# Patient Record
Sex: MALE | Race: WHITE | URBAN - METROPOLITAN AREA
[De-identification: names, ages, dates, MRNs, and addresses within clinical notes are randomized per-mention and may not be internally consistent; named-entity substitution may affect disease eponyms.]

---

## 2017-07-12 ENCOUNTER — OFFICE VISIT (OUTPATIENT)
Dept: INTERNAL MEDICINE | Age: 67
End: 2017-07-12

## 2017-07-12 VITALS
DIASTOLIC BLOOD PRESSURE: 88 MMHG | WEIGHT: 179 LBS | HEIGHT: 69 IN | BODY MASS INDEX: 26.51 KG/M2 | SYSTOLIC BLOOD PRESSURE: 142 MMHG

## 2017-07-12 DIAGNOSIS — E03.8 HYPOTHYROIDISM DUE TO HASHIMOTO'S THYROIDITIS: ICD-10-CM

## 2017-07-12 DIAGNOSIS — E06.3 HYPOTHYROIDISM DUE TO HASHIMOTO'S THYROIDITIS: ICD-10-CM

## 2017-07-12 DIAGNOSIS — I10 ESSENTIAL HYPERTENSION, BENIGN: ICD-10-CM

## 2017-07-12 DIAGNOSIS — E78.00 PURE HYPERCHOLESTEROLEMIA: ICD-10-CM

## 2017-07-12 DIAGNOSIS — Z00.00 WELL ADULT EXAM: Primary | ICD-10-CM

## 2017-07-12 DIAGNOSIS — J30.1 ALLERGIC RHINITIS DUE TO POLLEN, UNSPECIFIED RHINITIS SEASONALITY: ICD-10-CM

## 2017-07-12 LAB — TSH SERPL DL<=0.05 MIU/L-ACNC: 1.7 UIU/ML (ref 0.27–4.2)

## 2017-07-12 PROCEDURE — 99397 PER PM REEVAL EST PAT 65+ YR: CPT | Performed by: INTERNAL MEDICINE

## 2017-07-12 PROCEDURE — 93000 ELECTROCARDIOGRAM COMPLETE: CPT | Performed by: INTERNAL MEDICINE

## 2017-07-12 ASSESSMENT — PATIENT HEALTH QUESTIONNAIRE - PHQ9
SUM OF ALL RESPONSES TO PHQ QUESTIONS 1-9: 0
2. FEELING DOWN, DEPRESSED OR HOPELESS: 0
1. LITTLE INTEREST OR PLEASURE IN DOING THINGS: 0
SUM OF ALL RESPONSES TO PHQ9 QUESTIONS 1 & 2: 0

## 2017-09-28 RX ORDER — LOSARTAN POTASSIUM AND HYDROCHLOROTHIAZIDE 25; 100 MG/1; MG/1
TABLET ORAL
Qty: 90 TABLET | Refills: 3 | Status: SHIPPED | OUTPATIENT
Start: 2017-09-28 | End: 2018-09-19 | Stop reason: SDUPTHER

## 2017-09-28 RX ORDER — PRAVASTATIN SODIUM 40 MG
TABLET ORAL
Qty: 90 TABLET | Refills: 3 | Status: SHIPPED | OUTPATIENT
Start: 2017-09-28 | End: 2018-09-19 | Stop reason: SDUPTHER

## 2017-10-26 ENCOUNTER — TELEPHONE (OUTPATIENT)
Dept: INTERNAL MEDICINE | Age: 67
End: 2017-10-26

## 2017-10-26 RX ORDER — LEVOTHYROXINE SODIUM 0.05 MG/1
TABLET ORAL
Qty: 90 TABLET | Refills: 3 | Status: SHIPPED | OUTPATIENT
Start: 2017-10-26 | End: 2018-10-10 | Stop reason: SDUPTHER

## 2018-03-01 ENCOUNTER — OFFICE VISIT (OUTPATIENT)
Dept: INTERNAL MEDICINE | Age: 68
End: 2018-03-01

## 2018-03-01 VITALS
OXYGEN SATURATION: 95 % | BODY MASS INDEX: 26.51 KG/M2 | WEIGHT: 179 LBS | HEART RATE: 90 BPM | HEIGHT: 69 IN | DIASTOLIC BLOOD PRESSURE: 90 MMHG | SYSTOLIC BLOOD PRESSURE: 158 MMHG | TEMPERATURE: 98 F

## 2018-03-01 DIAGNOSIS — J06.9 URI, ACUTE: Primary | ICD-10-CM

## 2018-03-01 PROCEDURE — 99213 OFFICE O/P EST LOW 20 MIN: CPT | Performed by: INTERNAL MEDICINE

## 2018-03-01 RX ORDER — AZITHROMYCIN 250 MG/1
250 TABLET, FILM COATED ORAL SEE ADMIN INSTRUCTIONS
Qty: 6 TABLET | Refills: 0 | Status: SHIPPED | OUTPATIENT
Start: 2018-03-01 | End: 2018-08-20 | Stop reason: CLARIF

## 2018-03-01 ASSESSMENT — ENCOUNTER SYMPTOMS
NAUSEA: 0
SORE THROAT: 0
CHEST TIGHTNESS: 0
RHINORRHEA: 1
WHEEZING: 0
SINUS PRESSURE: 0
EYE REDNESS: 0
SHORTNESS OF BREATH: 0
BACK PAIN: 0
COUGH: 1
ABDOMINAL PAIN: 0

## 2018-03-01 NOTE — PROGRESS NOTES
(ZITHROMAX) 250 MG tablet; Take 1 tablet by mouth See Admin Instructions Take 2 tablets at once on the first day , then one tablet daily till all are gone  Dispense: 6 tablet;  Refill: 0

## 2018-03-05 RX ORDER — PAROXETINE HYDROCHLORIDE 20 MG/1
TABLET, FILM COATED ORAL
Qty: 90 TABLET | Refills: 3 | Status: SHIPPED | OUTPATIENT
Start: 2018-03-05 | End: 2018-10-08 | Stop reason: SDUPTHER

## 2018-08-20 ENCOUNTER — OFFICE VISIT (OUTPATIENT)
Dept: INTERNAL MEDICINE | Age: 68
End: 2018-08-20

## 2018-08-20 VITALS
SYSTOLIC BLOOD PRESSURE: 136 MMHG | DIASTOLIC BLOOD PRESSURE: 90 MMHG | WEIGHT: 172 LBS | HEIGHT: 69 IN | BODY MASS INDEX: 25.48 KG/M2

## 2018-08-20 DIAGNOSIS — E78.00 PURE HYPERCHOLESTEROLEMIA: ICD-10-CM

## 2018-08-20 DIAGNOSIS — I10 ESSENTIAL HYPERTENSION, BENIGN: ICD-10-CM

## 2018-08-20 DIAGNOSIS — Z00.00 WELL ADULT EXAM: Primary | ICD-10-CM

## 2018-08-20 PROCEDURE — 93000 ELECTROCARDIOGRAM COMPLETE: CPT | Performed by: INTERNAL MEDICINE

## 2018-08-20 PROCEDURE — 99397 PER PM REEVAL EST PAT 65+ YR: CPT | Performed by: INTERNAL MEDICINE

## 2018-08-20 ASSESSMENT — PATIENT HEALTH QUESTIONNAIRE - PHQ9
2. FEELING DOWN, DEPRESSED OR HOPELESS: 0
SUM OF ALL RESPONSES TO PHQ QUESTIONS 1-9: 0
1. LITTLE INTEREST OR PLEASURE IN DOING THINGS: 0
SUM OF ALL RESPONSES TO PHQ QUESTIONS 1-9: 0
SUM OF ALL RESPONSES TO PHQ9 QUESTIONS 1 & 2: 0

## 2018-08-20 NOTE — PROGRESS NOTES
Annual Wellness Visit     Patient:  Michelle Reynoso                                               : 1950  Age: 76 y.o. MRN: [id number]  Date : 2018      CHIEF COMPLAINT: Michelle Reynoso is a 76 y.o. male who presents for : Physical exam    1. Well adult exam  Gen. he feels okay denies any chest pain shortness of breath or any other problems and exercising regularly does get occasional nosebleeds which stop with pressure on  the nares  - EKG 12 lead    2. Essential hypertension, benign  No complaints blood pressures been averaging about 120-80  to 130/90  - EKG 12 lead    3.  Pure hypercholesterolemia  No complaints no myalgias  - EKG 12 lead        Patient Active Problem List    Diagnosis Date Noted    Hypothyroid 2014    Depressive disorder, not elsewhere classified 2010    Essential hypertension, benign 2010    Allergic rhinitis 2010    Pure hypercholesterolemia 2010       Constitutional:  Denies fever or chills   Eyes:  Denies change in visual acuity   HENT:  Denies nasal congestion or sore throat   Respiratory:  Denies cough or shortness of breath   Cardiovascular:  Denies chest pain or edema   GI:  Denies abdominal pain, nausea, vomiting, bloody stools or diarrhea   :  Denies dysuria   Musculoskeletal:  Denies back pain or joint pain   Integument:  Denies rash   Neurologic:  Denies headache, focal weakness or sensory changes   Endocrine:  Denies polyuria or polydipsia   Lymphatic:  Denies swollen glands   Psychiatric:  Denies depression or anxiety     Past Medical History:        Diagnosis Date    Allergic rhinitis, cause unspecified 2010    Backache, unspecified 2010    Depressive disorder, not elsewhere classified 2010    Essential hypertension, benign 2010    Hypothyroid 2014    Myalgia 2012    Other and unspecified hyperlipidemia 2010    Subclinical hypothyroidism 2013       Past Surgical History:        Procedure

## 2018-09-19 RX ORDER — PRAVASTATIN SODIUM 40 MG
TABLET ORAL
Qty: 90 TABLET | Refills: 3 | Status: SHIPPED | OUTPATIENT
Start: 2018-09-19 | End: 2019-12-17 | Stop reason: SDUPTHER

## 2018-09-19 RX ORDER — LOSARTAN POTASSIUM AND HYDROCHLOROTHIAZIDE 25; 100 MG/1; MG/1
TABLET ORAL
Qty: 90 TABLET | Refills: 3 | Status: SHIPPED | OUTPATIENT
Start: 2018-09-19 | End: 2019-12-17 | Stop reason: SDUPTHER

## 2018-09-25 ENCOUNTER — OFFICE VISIT (OUTPATIENT)
Dept: ENT CLINIC | Age: 68
End: 2018-09-25
Payer: COMMERCIAL

## 2018-09-25 VITALS
SYSTOLIC BLOOD PRESSURE: 158 MMHG | DIASTOLIC BLOOD PRESSURE: 100 MMHG | WEIGHT: 172 LBS | HEART RATE: 98 BPM | OXYGEN SATURATION: 88 % | BODY MASS INDEX: 25.4 KG/M2

## 2018-09-25 DIAGNOSIS — R04.0 LEFT-SIDED EPISTAXIS: Primary | ICD-10-CM

## 2018-09-25 PROCEDURE — 30901 CONTROL OF NOSEBLEED: CPT | Performed by: OTOLARYNGOLOGY

## 2018-10-08 ENCOUNTER — TELEPHONE (OUTPATIENT)
Dept: INTERNAL MEDICINE CLINIC | Age: 68
End: 2018-10-08

## 2018-10-08 RX ORDER — PAROXETINE 30 MG/1
TABLET, FILM COATED ORAL
Qty: 30 TABLET | Refills: 2 | Status: SHIPPED | OUTPATIENT
Start: 2018-10-08 | End: 2018-11-05 | Stop reason: SDUPTHER

## 2018-10-10 RX ORDER — LEVOTHYROXINE SODIUM 50 MCG
TABLET ORAL
Qty: 90 TABLET | Refills: 3 | Status: SHIPPED | OUTPATIENT
Start: 2018-10-10 | End: 2019-09-24 | Stop reason: SDUPTHER

## 2018-11-05 ENCOUNTER — OFFICE VISIT (OUTPATIENT)
Dept: INTERNAL MEDICINE CLINIC | Age: 68
End: 2018-11-05
Payer: COMMERCIAL

## 2018-11-05 ENCOUNTER — TELEPHONE (OUTPATIENT)
Dept: INTERNAL MEDICINE CLINIC | Age: 68
End: 2018-11-05

## 2018-11-05 VITALS
HEIGHT: 69 IN | WEIGHT: 168 LBS | DIASTOLIC BLOOD PRESSURE: 100 MMHG | SYSTOLIC BLOOD PRESSURE: 160 MMHG | BODY MASS INDEX: 24.88 KG/M2

## 2018-11-05 DIAGNOSIS — R04.0 EPISTAXIS: ICD-10-CM

## 2018-11-05 DIAGNOSIS — F41.9 ANXIETY: Primary | ICD-10-CM

## 2018-11-05 PROCEDURE — 99213 OFFICE O/P EST LOW 20 MIN: CPT | Performed by: INTERNAL MEDICINE

## 2018-11-05 RX ORDER — PAROXETINE HYDROCHLORIDE 40 MG/1
TABLET, FILM COATED ORAL
Qty: 30 TABLET | Refills: 5 | Status: SHIPPED | OUTPATIENT
Start: 2018-11-05 | End: 2019-01-11 | Stop reason: SDUPTHER

## 2018-11-06 ENCOUNTER — TELEPHONE (OUTPATIENT)
Dept: INTERNAL MEDICINE CLINIC | Age: 68
End: 2018-11-06

## 2018-11-06 NOTE — TELEPHONE ENCOUNTER
Pt calling in stating he has been having trouble falling asleep at night.  It's been really bad the last 3 nights  Pt would like an Rx to help him fall asleep   pls send to [facility] [place]

## 2018-11-09 ENCOUNTER — OFFICE VISIT (OUTPATIENT)
Dept: PSYCHOLOGY | Age: 68
End: 2018-11-09
Payer: COMMERCIAL

## 2018-11-09 DIAGNOSIS — F41.1 GAD (GENERALIZED ANXIETY DISORDER): ICD-10-CM

## 2018-11-09 PROCEDURE — 90791 PSYCH DIAGNOSTIC EVALUATION: CPT | Performed by: PSYCHOLOGIST

## 2018-11-09 ASSESSMENT — ANXIETY QUESTIONNAIRES
1. FEELING NERVOUS, ANXIOUS, OR ON EDGE: 2-OVER HALF THE DAYS
2. NOT BEING ABLE TO STOP OR CONTROL WORRYING: 2-OVER HALF THE DAYS
3. WORRYING TOO MUCH ABOUT DIFFERENT THINGS: 2-OVER HALF THE DAYS
5. BEING SO RESTLESS THAT IT IS HARD TO SIT STILL: 0-NOT AT ALL SURE
GAD7 TOTAL SCORE: 9
7. FEELING AFRAID AS IF SOMETHING AWFUL MIGHT HAPPEN: 1-SEVERAL DAYS
6. BECOMING EASILY ANNOYED OR IRRITABLE: 0-NOT AT ALL SURE
4. TROUBLE RELAXING: 2-OVER HALF THE DAYS

## 2018-11-09 ASSESSMENT — PATIENT HEALTH QUESTIONNAIRE - PHQ9
4. FEELING TIRED OR HAVING LITTLE ENERGY: 1
7. TROUBLE CONCENTRATING ON THINGS, SUCH AS READING THE NEWSPAPER OR WATCHING TELEVISION: 0
2. FEELING DOWN, DEPRESSED OR HOPELESS: 2
10. IF YOU CHECKED OFF ANY PROBLEMS, HOW DIFFICULT HAVE THESE PROBLEMS MADE IT FOR YOU TO DO YOUR WORK, TAKE CARE OF THINGS AT HOME, OR GET ALONG WITH OTHER PEOPLE: 1
SUM OF ALL RESPONSES TO PHQ9 QUESTIONS 1 & 2: 3
8. MOVING OR SPEAKING SO SLOWLY THAT OTHER PEOPLE COULD HAVE NOTICED. OR THE OPPOSITE, BEING SO FIGETY OR RESTLESS THAT YOU HAVE BEEN MOVING AROUND A LOT MORE THAN USUAL: 0
SUM OF ALL RESPONSES TO PHQ QUESTIONS 1-9: 9
3. TROUBLE FALLING OR STAYING ASLEEP: 2
9. THOUGHTS THAT YOU WOULD BE BETTER OFF DEAD, OR OF HURTING YOURSELF: 0
5. POOR APPETITE OR OVEREATING: 2
1. LITTLE INTEREST OR PLEASURE IN DOING THINGS: 1
6. FEELING BAD ABOUT YOURSELF - OR THAT YOU ARE A FAILURE OR HAVE LET YOURSELF OR YOUR FAMILY DOWN: 1
SUM OF ALL RESPONSES TO PHQ QUESTIONS 1-9: 9

## 2018-11-09 NOTE — PROGRESS NOTES
nose surgery he had at age 25 that resulted in him hemorrhaging. He also has also significant life stressors at this time, including his daughter having breast cancer. Mr. Joey Liang currently engages in regular physical activity which is a positive, protective coping strategy. He has limited other coping strategies and is interested in learning additional strategies. PHQ Scores 11/9/2018 8/20/2018 7/12/2017 8/18/2014   PHQ2 Score 3 0 0 0   PHQ9 Score 9 0 0 0     Interpretation of Total Score Depression Severity: 1-4 = Minimal depression, 5-9 = Mild depression, 10-14 = Moderate depression, 15-19 = Moderately severe depression, 20-27 = Severe depression        JABIER 7 SCORE 11/9/2018   JABIER-7 Total Score 9     Interpretation of JABIER-7 score: 5-9 = mild anxiety, 10-14 = moderate anxiety, 15+ = severe anxiety. Recommend referral to behavioral health for scores 10 or greater.           Diagnosis:    JABIER    Plan:  Pt interventions:  Established rapport, Conducted functional assessment, [place]-setting to identify pt's primary goals for [facility] visit / overall health, Supportive techniques, Emphasized self-care as important for managing overall health and Provided Psychoeducation re: anxiety, stress response, role of medication in treatment, role of breathing on emotions, ABCs of emotions, false alarm of chronic anxiety,  taught and practiced diaphragmatic breathing, taught about the role of sleep, exercise, diet, physical illness, and drug/etoh use on mood and discussed skill mastery vs skill generalization, treatment planning         Pt Behavioral Change Plan:  Pt set goals to 1) practice diaphragmatic breathing 2x/day for 10 min when not anxious to work on skill mastery before skill generalization (download the free josie, [id number], to practice if needed)  2) return for f/u in one week

## 2018-11-12 PROBLEM — F41.1 GAD (GENERALIZED ANXIETY DISORDER): Status: ACTIVE | Noted: 2018-11-12

## 2018-11-16 ENCOUNTER — OFFICE VISIT (OUTPATIENT)
Dept: PSYCHOLOGY | Age: 68
End: 2018-11-16
Payer: COMMERCIAL

## 2018-11-16 DIAGNOSIS — F41.1 GAD (GENERALIZED ANXIETY DISORDER): Primary | ICD-10-CM

## 2018-11-16 PROCEDURE — 90832 PSYTX W PT 30 MINUTES: CPT | Performed by: PSYCHOLOGIST

## 2018-11-16 ASSESSMENT — PATIENT HEALTH QUESTIONNAIRE - PHQ9
2. FEELING DOWN, DEPRESSED OR HOPELESS: 1
10. IF YOU CHECKED OFF ANY PROBLEMS, HOW DIFFICULT HAVE THESE PROBLEMS MADE IT FOR YOU TO DO YOUR WORK, TAKE CARE OF THINGS AT HOME, OR GET ALONG WITH OTHER PEOPLE: 1
SUM OF ALL RESPONSES TO PHQ QUESTIONS 1-9: 5
5. POOR APPETITE OR OVEREATING: 1
6. FEELING BAD ABOUT YOURSELF - OR THAT YOU ARE A FAILURE OR HAVE LET YOURSELF OR YOUR FAMILY DOWN: 1
SUM OF ALL RESPONSES TO PHQ QUESTIONS 1-9: 5
7. TROUBLE CONCENTRATING ON THINGS, SUCH AS READING THE NEWSPAPER OR WATCHING TELEVISION: 0
8. MOVING OR SPEAKING SO SLOWLY THAT OTHER PEOPLE COULD HAVE NOTICED. OR THE OPPOSITE, BEING SO FIGETY OR RESTLESS THAT YOU HAVE BEEN MOVING AROUND A LOT MORE THAN USUAL: 0
3. TROUBLE FALLING OR STAYING ASLEEP: 1
9. THOUGHTS THAT YOU WOULD BE BETTER OFF DEAD, OR OF HURTING YOURSELF: 0
4. FEELING TIRED OR HAVING LITTLE ENERGY: 0
SUM OF ALL RESPONSES TO PHQ9 QUESTIONS 1 & 2: 2
1. LITTLE INTEREST OR PLEASURE IN DOING THINGS: 1

## 2018-11-16 ASSESSMENT — ANXIETY QUESTIONNAIRES
7. FEELING AFRAID AS IF SOMETHING AWFUL MIGHT HAPPEN: 0-NOT AT ALL SURE
5. BEING SO RESTLESS THAT IT IS HARD TO SIT STILL: 0-NOT AT ALL SURE
3. WORRYING TOO MUCH ABOUT DIFFERENT THINGS: 1-SEVERAL DAYS
GAD7 TOTAL SCORE: 4
1. FEELING NERVOUS, ANXIOUS, OR ON EDGE: 1-SEVERAL DAYS
4. TROUBLE RELAXING: 1-SEVERAL DAYS
6. BECOMING EASILY ANNOYED OR IRRITABLE: 0-NOT AT ALL SURE
2. NOT BEING ABLE TO STOP OR CONTROL WORRYING: 1-SEVERAL DAYS

## 2018-11-16 NOTE — PROGRESS NOTES
EVENING 90 tablet 3    losartan-hydrochlorothiazide (HYZAAR) 100-25 MG per tablet TAKE 1 TABLET DAILY 90 tablet 3    Cholecalciferol (VITAMIN D) 1000 UNIT CAPS Take  by mouth.  Multiple Vitamin (MULTIVITAMINS PO) Take  by mouth.  aspirin 81 MG tablet Take 81 mg by mouth daily. No current facility-administered medications for this visit. Social History:   Social History     Social History    Marital status:      Spouse name: N/A    Number of children: N/A    Years of education: N/A     Occupational History    Not on file. Social History Main Topics    Smoking status: Never Smoker    Smokeless tobacco: Never Used    Alcohol use Yes      Comment: occasionally    Drug use: Unknown    Sexual activity: Not on file     Other Topics Concern    Not on file     Social History Narrative    No narrative on file       TOBACCO:   reports that he has never smoked. He has never used smokeless tobacco.  ETOH:   reports that he drinks alcohol. Family History:   Family History   Problem Relation Age of Onset    Diabetes Father     Hypertension Father          A:  Mr. Tavia Alexander anxiety has improved since the last visit. He reports regularly practicing diaphragmatic breathing which he finds relaxing and helpful. Mr. Berna Reddy states he has felt \"like [his] normal self\" again, not getting overwhelmed with anxious thoughts about his nose bleeds and other stressors. He continue to learn strategies to manage his anxiety as he does continue to experience generalized anxiety. He was active and engaged and responded positively to behavioral interventions.     PHQ Scores 11/16/2018 11/9/2018 8/20/2018 7/12/2017 8/18/2014   PHQ2 Score 2 3 0 0 0   PHQ9 Score 5 9 0 0 0     Interpretation of Total Score Depression Severity: 1-4 = Minimal depression, 5-9 = Mild depression, 10-14 = Moderate depression, 15-19 = Moderately severe depression, 20-27 = Severe depression    JABIER 7 SCORE 11/16/2018 11/9/2018

## 2018-12-03 ENCOUNTER — OFFICE VISIT (OUTPATIENT)
Dept: PSYCHOLOGY | Age: 68
End: 2018-12-03
Payer: COMMERCIAL

## 2018-12-03 DIAGNOSIS — F41.1 GAD (GENERALIZED ANXIETY DISORDER): Primary | ICD-10-CM

## 2018-12-03 PROCEDURE — 90832 PSYTX W PT 30 MINUTES: CPT | Performed by: PSYCHOLOGIST

## 2018-12-03 ASSESSMENT — ANXIETY QUESTIONNAIRES
GAD7 TOTAL SCORE: 0
5. BEING SO RESTLESS THAT IT IS HARD TO SIT STILL: 0-NOT AT ALL SURE
4. TROUBLE RELAXING: 0-NOT AT ALL SURE
1. FEELING NERVOUS, ANXIOUS, OR ON EDGE: 0-NOT AT ALL SURE
2. NOT BEING ABLE TO STOP OR CONTROL WORRYING: 0-NOT AT ALL SURE
7. FEELING AFRAID AS IF SOMETHING AWFUL MIGHT HAPPEN: 0-NOT AT ALL SURE
3. WORRYING TOO MUCH ABOUT DIFFERENT THINGS: 0-NOT AT ALL SURE
6. BECOMING EASILY ANNOYED OR IRRITABLE: 0-NOT AT ALL SURE

## 2018-12-03 ASSESSMENT — PATIENT HEALTH QUESTIONNAIRE - PHQ9
2. FEELING DOWN, DEPRESSED OR HOPELESS: 0
1. LITTLE INTEREST OR PLEASURE IN DOING THINGS: 0
SUM OF ALL RESPONSES TO PHQ QUESTIONS 1-9: 0
SUM OF ALL RESPONSES TO PHQ QUESTIONS 1-9: 0
SUM OF ALL RESPONSES TO PHQ9 QUESTIONS 1 & 2: 0

## 2018-12-03 NOTE — PATIENT INSTRUCTIONS
\"The entire autonomic nervous system (and through it, our internal organs and glands) is largely driven by our breathing patterns. By changing our breathing we can influence millions of biochemical reactions in our body, producing more relaxing substances such as endorphins and fewer anxiety-producing ones like adrenaline and higher blood acidity. Mindfulness of the breath is so effective that it is common to all meditative and prayer traditions. \" Anxiety Fear & Breathing - Breathing. com    \"When overcoming high levels of anxiety, it is important to learn the techniques of correct breathing. Many people who live with high levels of anxiety are known to breathe through their chest. Shallow breathing through the chest means you are disrupting the balance of oxygen and carbon dioxide necessary to be in a relaxed state. This type of breathing will perpetuate the symptoms of anxiety. \" yoonew. [employer]        CONTROLLED or MEASURED BREATHING EXERCISE: (DOWNLOAD THE FREE DAMIEN \"VIRTUAL HOPE BOX\" TO PRACTICE THIS EXERCISE)        You can sit or stand, but be sure to soften up a little before you begin. Make sure your hands are relaxed, and your knees are soft. Drop your shoulders and let your jaw relax. Now breath in slowly through your nose and count to three, keep your shoulders down and allow your stomach to expand as you breathe in. Hold the breath for a moment. Now release your breath slowly and smoothly as you count to six.   Repeat for a couple of minutes

## 2018-12-06 ENCOUNTER — OFFICE VISIT (OUTPATIENT)
Dept: INTERNAL MEDICINE CLINIC | Age: 68
End: 2018-12-06
Payer: COMMERCIAL

## 2018-12-06 VITALS
WEIGHT: 174 LBS | HEIGHT: 69 IN | SYSTOLIC BLOOD PRESSURE: 140 MMHG | BODY MASS INDEX: 25.77 KG/M2 | DIASTOLIC BLOOD PRESSURE: 80 MMHG

## 2018-12-06 DIAGNOSIS — I10 ESSENTIAL HYPERTENSION, BENIGN: Primary | ICD-10-CM

## 2018-12-06 DIAGNOSIS — F41.1 GAD (GENERALIZED ANXIETY DISORDER): ICD-10-CM

## 2018-12-06 PROCEDURE — 99213 OFFICE O/P EST LOW 20 MIN: CPT | Performed by: INTERNAL MEDICINE

## 2019-01-11 DIAGNOSIS — R04.0 EPISTAXIS: ICD-10-CM

## 2019-01-11 DIAGNOSIS — F41.9 ANXIETY: ICD-10-CM

## 2019-01-11 RX ORDER — PAROXETINE HYDROCHLORIDE 40 MG/1
TABLET, FILM COATED ORAL
Qty: 90 TABLET | Refills: 0 | Status: SHIPPED | OUTPATIENT
Start: 2019-01-11 | End: 2019-04-22 | Stop reason: SDUPTHER

## 2019-03-21 RX ORDER — PAROXETINE HYDROCHLORIDE 20 MG/1
TABLET, FILM COATED ORAL
Qty: 90 TABLET | Refills: 3 | Status: SHIPPED | OUTPATIENT
Start: 2019-03-21 | End: 2019-04-22 | Stop reason: DRUGHIGH

## 2019-04-22 DIAGNOSIS — R04.0 EPISTAXIS: ICD-10-CM

## 2019-04-22 DIAGNOSIS — F41.9 ANXIETY: ICD-10-CM

## 2019-04-22 RX ORDER — PAROXETINE HYDROCHLORIDE 40 MG/1
TABLET, FILM COATED ORAL
Qty: 90 TABLET | Refills: 2 | Status: SHIPPED | OUTPATIENT
Start: 2019-04-22 | End: 2020-01-06

## 2019-09-18 ENCOUNTER — OFFICE VISIT (OUTPATIENT)
Dept: INTERNAL MEDICINE CLINIC | Age: 69
End: 2019-09-18
Payer: COMMERCIAL

## 2019-09-18 VITALS
DIASTOLIC BLOOD PRESSURE: 90 MMHG | SYSTOLIC BLOOD PRESSURE: 150 MMHG | WEIGHT: 174 LBS | HEIGHT: 69 IN | BODY MASS INDEX: 25.77 KG/M2

## 2019-09-18 DIAGNOSIS — E06.3 HYPOTHYROIDISM DUE TO HASHIMOTO'S THYROIDITIS: ICD-10-CM

## 2019-09-18 DIAGNOSIS — I10 ESSENTIAL HYPERTENSION, BENIGN: ICD-10-CM

## 2019-09-18 DIAGNOSIS — Z00.00 WELLNESS EXAMINATION: Primary | ICD-10-CM

## 2019-09-18 DIAGNOSIS — E78.00 PURE HYPERCHOLESTEROLEMIA: ICD-10-CM

## 2019-09-18 DIAGNOSIS — E03.8 HYPOTHYROIDISM DUE TO HASHIMOTO'S THYROIDITIS: ICD-10-CM

## 2019-09-18 PROBLEM — R04.0 LEFT-SIDED EPISTAXIS: Status: RESOLVED | Noted: 2018-09-25 | Resolved: 2019-09-18

## 2019-09-18 PROCEDURE — G0439 PPPS, SUBSEQ VISIT: HCPCS | Performed by: INTERNAL MEDICINE

## 2019-09-18 PROCEDURE — 93000 ELECTROCARDIOGRAM COMPLETE: CPT | Performed by: INTERNAL MEDICINE

## 2019-09-18 RX ORDER — FEXOFENADINE HCL 180 MG/1
180 TABLET ORAL DAILY
COMMUNITY

## 2019-09-18 RX ORDER — AZELASTINE HYDROCHLORIDE, FLUTICASONE PROPIONATE 137; 50 UG/1; UG/1
1 SPRAY, METERED NASAL DAILY
COMMUNITY
End: 2022-02-22 | Stop reason: CLARIF

## 2019-09-18 NOTE — PROGRESS NOTES
Annual Wellness Visit     Patient:  Carol Ann Quezada                                               : 1950  Age: 71 y.o. MRN: [id number]  Date : 2019      CHIEF COMPLAINT: Carol Ann Quezada is a 71 y.o. male who presents for : Physical exam    1. Wellness examination  He feels okay denies any chest pain shortness of breath or any other problems been exercising regularly  - EKG 12 Lead    2. Essential hypertension, benign  Complaints  - EKG 12 Lead    3. Pure hypercholesterolemia  Complaints no myalgias    4.  Hypothyroidism due to Hashimoto's thyroiditis  In terms of hypo-or hyperthyroidism        Patient Active Problem List    Diagnosis Date Noted    JABIER (generalized anxiety disorder) 2018    Hypothyroid 2014    Depressive disorder, not elsewhere classified 2010    Essential hypertension, benign 2010    Allergic rhinitis 2010    Pure hypercholesterolemia 2010       Constitutional:  Denies fever or chills   Eyes:  Denies change in visual acuity   HENT:  Denies nasal congestion or sore throat   Respiratory:  Denies cough or shortness of breath   Cardiovascular:  Denies chest pain or edema   GI:  Denies abdominal pain, nausea, vomiting, bloody stools or diarrhea   :  Denies dysuria   Musculoskeletal:  Denies back pain or joint pain   Integument:  Denies rash   Neurologic:  Denies headache, focal weakness or sensory changes   Endocrine:  Denies polyuria or polydipsia   Lymphatic:  Denies swollen glands   Psychiatric:  Denies depression or anxiety     Past Medical History:        Diagnosis Date    Allergic rhinitis, cause unspecified 2010    Backache, unspecified 2010    Depressive disorder, not elsewhere classified 2010    Essential hypertension, benign 2010    Hypothyroid 2014    Myalgia 2012    Other and unspecified hyperlipidemia 2010    Subclinical hypothyroidism 2013       Past Surgical History:        Procedure Laterality Date    UVULOPALATOPHARYGOPLASTY         Family History:  Family History   Problem Relation Age of Onset    Diabetes Father     Hypertension Father        Social History:  Social History     Socioeconomic History    Marital status:      Spouse name: None    Number of children: None    Years of education: None    Highest education level: None   Occupational History    None   Social Needs    Financial resource strain: None    Food insecurity:     Worry: None     Inability: None    Transportation needs:     Medical: None     Non-medical: None   Tobacco Use    Smoking status: Never Smoker    Smokeless tobacco: Never Used   Substance and Sexual Activity    Alcohol use: Yes     Comment: occasionally    Drug use: None    Sexual activity: None   Lifestyle    Physical activity:     Days per week: None     Minutes per session: None    Stress: None   Relationships    Social connections:     Talks on phone: None     Gets together: None     Attends Worship service: None     Active member of club or organization: None     Attends meetings of clubs or organizations: None     Relationship status: None    Intimate partner violence:     Fear of current or ex partner: None     Emotionally abused: None     Physically abused: None     Forced sexual activity: None   Other Topics Concern    None   Social History Narrative    None         Allergies:  Patient has no known allergies. Current Medications:    Prior to Admission medications    Medication Sig Start Date End Date Taking?  Authorizing Provider   fexofenadine (ALLEGRA ALLERGY) 180 MG tablet Take 180 mg by mouth daily   Yes Historical Provider, MD   Azelastine-Fluticasone (DYMISTA) 137-50 MCG/ACT SUSP 1 spray by Nasal route daily Left nostril   Yes Historical Provider, MD   PARoxetine (PAXIL) 40 MG tablet TAKE 1 TABLET EVERY MORNING 4/22/19  Yes Perla Ragsdale MD   SYNTHROID 50 MCG tablet TAKE 1 TABLET DAILY 10/10/18  Yes Perla Ragsdale MD (H) 09/22/2011       LFT's: No results found for: ALT, AST, GGT, ALKPHOS, BILITOT    Lipids: No results found for: CHOL, HDL, LDLCALC, TRIG    INR: No results found for: INR, PROTIME    U/A:No results found for: LABMICR, [id number]     No results found for: LABA1C     Lab Results   Component Value Date    CREATININE 1.2 09/22/2011       -----------------------------------------------------------------     Assessment/Plan:   1. Wellness examination  Screening labs he is up-to-date on all his immunizations continue diet and exercise he got a flu shot yesterday  - EKG 12 Lead    2. Essential hypertension, benign  Blood pressure slightly elevated question of whitecoat hypertension he is to check his blood pressure 3 times at home and call with results continue present meds for now  - EKG 12 Lead    3. Pure hypercholesterolemia  Problem stable continue present meds check above labs    4.  Hypothyroidism due to Hashimoto's thyroiditis  Clinically euthyroid check above labs continue present meds for now

## 2019-09-24 RX ORDER — LEVOTHYROXINE SODIUM 50 MCG
TABLET ORAL
Qty: 90 TABLET | Refills: 3 | Status: SHIPPED | OUTPATIENT
Start: 2019-09-24 | End: 2020-01-29 | Stop reason: SDUPTHER

## 2019-12-18 RX ORDER — LOSARTAN POTASSIUM AND HYDROCHLOROTHIAZIDE 25; 100 MG/1; MG/1
TABLET ORAL
Qty: 90 TABLET | Refills: 3 | Status: SHIPPED | OUTPATIENT
Start: 2019-12-18 | End: 2020-01-29 | Stop reason: SDUPTHER

## 2019-12-18 RX ORDER — PRAVASTATIN SODIUM 40 MG
TABLET ORAL
Qty: 90 TABLET | Refills: 3 | Status: SHIPPED | OUTPATIENT
Start: 2019-12-18 | End: 2020-01-29 | Stop reason: SDUPTHER

## 2020-01-06 RX ORDER — PAROXETINE HYDROCHLORIDE 40 MG/1
TABLET, FILM COATED ORAL
Qty: 90 TABLET | Refills: 2 | Status: SHIPPED | OUTPATIENT
Start: 2020-01-06 | End: 2020-01-29 | Stop reason: SDUPTHER

## 2020-01-29 ENCOUNTER — TELEPHONE (OUTPATIENT)
Dept: INTERNAL MEDICINE CLINIC | Age: 70
End: 2020-01-29

## 2020-01-29 RX ORDER — PRAVASTATIN SODIUM 40 MG
TABLET ORAL
Qty: 90 TABLET | Refills: 3 | Status: SHIPPED | OUTPATIENT
Start: 2020-01-29 | End: 2021-02-03

## 2020-01-29 RX ORDER — LEVOTHYROXINE SODIUM 0.05 MG/1
TABLET ORAL
Qty: 90 TABLET | Refills: 3 | Status: SHIPPED | OUTPATIENT
Start: 2020-01-29 | End: 2021-02-03

## 2020-01-29 RX ORDER — LOSARTAN POTASSIUM AND HYDROCHLOROTHIAZIDE 25; 100 MG/1; MG/1
TABLET ORAL
Qty: 90 TABLET | Refills: 3 | Status: SHIPPED
Start: 2020-01-29 | End: 2020-04-21 | Stop reason: RX

## 2020-01-29 RX ORDER — PAROXETINE HYDROCHLORIDE 40 MG/1
TABLET, FILM COATED ORAL
Qty: 90 TABLET | Refills: 2 | Status: SHIPPED | OUTPATIENT
Start: 2020-01-29 | End: 2021-01-18 | Stop reason: SDUPTHER

## 2020-04-21 ENCOUNTER — TELEPHONE (OUTPATIENT)
Dept: INTERNAL MEDICINE CLINIC | Age: 70
End: 2020-04-21

## 2020-04-21 RX ORDER — LOSARTAN POTASSIUM 100 MG/1
100 TABLET ORAL DAILY
Qty: 90 TABLET | Refills: 1 | Status: SHIPPED | OUTPATIENT
Start: 2020-04-21 | End: 2020-09-23 | Stop reason: SDUPTHER

## 2020-04-21 RX ORDER — HYDROCHLOROTHIAZIDE 25 MG/1
25 TABLET ORAL EVERY MORNING
Qty: 90 TABLET | Refills: 1 | Status: SHIPPED | OUTPATIENT
Start: 2020-04-21 | End: 2020-09-29 | Stop reason: SDUPTHER

## 2020-07-30 ENCOUNTER — OFFICE VISIT (OUTPATIENT)
Dept: PRIMARY CARE CLINIC | Age: 70
End: 2020-07-30
Payer: COMMERCIAL

## 2020-07-30 PROCEDURE — 99211 OFF/OP EST MAY X REQ PHY/QHP: CPT | Performed by: NURSE PRACTITIONER

## 2020-07-30 NOTE — PATIENT INSTRUCTIONS
Advance Care Planning  People with COVID-19 may have no symptoms, mild symptoms, such as fever, cough, and shortness of breath or they may have more severe illness, developing severe and fatal pneumonia. As a result, Advance Care Planning with attention to naming a health care decision maker (someone you trust to make healthcare decisions for you if you could not speak for yourself) and sharing other health care preferences is important BEFORE a possible health crisis. Please contact your Primary Care Provider to discuss Advance Care Planning. Preventing the Spread of Coronavirus Disease 2019 in Homes and Residential Communities  For the most recent information go to [email or web address]    Prevention steps for People with confirmed or suspected COVID-19 (including persons under investigation) who do not need to be hospitalized  and   People with confirmed COVID-19 who were hospitalized and determined to be medically stable to go home    Your healthcare provider and public health staff will evaluate whether you can be cared for at home. If it is determined that you do not need to be hospitalized and can be isolated at home, you will be monitored by staff from your local or state health department. You should follow the prevention steps below until a healthcare provider or local or state health department says you can return to your normal activities. Stay home except to get medical care  People who are mildly ill with COVID-19 are able to isolate at home during their illness. You should restrict activities outside your home, except for getting medical care. Do not go to work, school, or public areas. Avoid using public transportation, ride-sharing, or taxis. Separate yourself from other people and animals in your home  People: As much as possible, you should stay in a specific room and away from other people in your home.  Also, you should use a separate bathroom, if available. Animals: You should restrict contact with pets and other animals while you are sick with COVID-19, just like you would around other people. Although there have not been reports of pets or other animals becoming sick with COVID-19, it is still recommended that people sick with COVID-19 limit contact with animals until more information is known about the virus. When possible, have another member of your household care for your animals while you are sick. If you are sick with COVID-19, avoid contact with your pet, including petting, snuggling, being kissed or licked, and sharing food. If you must care for your pet or be around animals while you are sick, wash your hands before and after you interact with pets and wear a facemask. Call ahead before visiting your doctor  If you have a medical appointment, call the healthcare provider and tell them that you have or may have COVID-19. This will help the healthcare providers office take steps to keep other people from getting infected or exposed. Wear a facemask  You should wear a facemask when you are around other people (e.g., sharing a room or vehicle) or pets and before you enter a healthcare providers office. If you are not able to wear a facemask (for example, because it causes trouble breathing), then people who live with you should not stay in the same room with you, or they should wear a facemask if they enter your room. Cover your coughs and sneezes  Cover your mouth and nose with a tissue when you cough or sneeze. Throw used tissues in a lined trash can. Immediately wash your hands with soap and water for at least 20 seconds or, if soap and water are not available, clean your hands with an alcohol-based hand  that contains at least 60% alcohol.   Clean your hands often  Wash your hands often with soap and water for at least 20 seconds, especially after blowing your nose, coughing, or sneezing; going to the bathroom; and have a medical emergency and need to call 911, notify the dispatch personnel that you have, or are being evaluated for COVID-19. If possible, put on a facemask before emergency medical services arrive. Discontinuing home isolation  Patients with confirmed COVID-19 should remain under home isolation precautions until the risk of secondary transmission to others is thought to be low. The decision to discontinue home isolation precautions should be made on a case-by-case basis, in consultation with healthcare providers and state and local health departments.

## 2020-08-01 LAB — SARS-COV-2, NAA: NOT DETECTED

## 2020-09-23 RX ORDER — LOSARTAN POTASSIUM 100 MG/1
100 TABLET ORAL DAILY
Qty: 90 TABLET | Refills: 0 | Status: SHIPPED | OUTPATIENT
Start: 2020-09-23 | End: 2020-11-05 | Stop reason: CLARIF

## 2020-09-23 NOTE — TELEPHONE ENCOUNTER
Patient requesting medication refill for Losartan 100 mg and Hydrodiuril 25 mg 90 day supply for both medications sent to [facility]. Please Advise.

## 2020-09-29 ENCOUNTER — TELEPHONE (OUTPATIENT)
Dept: INTERNAL MEDICINE CLINIC | Age: 70
End: 2020-09-29

## 2020-09-29 RX ORDER — HYDROCHLOROTHIAZIDE 25 MG/1
25 TABLET ORAL EVERY MORNING
Qty: 90 TABLET | Refills: 0 | Status: SHIPPED
Start: 2020-09-29 | End: 2020-11-05 | Stop reason: CLARIF

## 2020-09-29 NOTE — TELEPHONE ENCOUNTER
Call returned to patient. Patient notified that he needs to schedule for a physical exam.    Medication sent to pharmacy.

## 2020-09-29 NOTE — TELEPHONE ENCOUNTER
Patient called to follow up on the refill request for hydroCHLOROthiazide (HYDRODIURIL) 25 MG tablet     [facility] - [address], [address] [facility] [address] [phone]     Please call and advise.

## 2020-10-01 ENCOUNTER — TELEPHONE (OUTPATIENT)
Dept: INTERNAL MEDICINE CLINIC | Age: 70
End: 2020-10-01

## 2020-10-20 DIAGNOSIS — E03.8 HYPOTHYROIDISM DUE TO HASHIMOTO'S THYROIDITIS: ICD-10-CM

## 2020-10-20 DIAGNOSIS — E78.00 PURE HYPERCHOLESTEROLEMIA: ICD-10-CM

## 2020-10-20 DIAGNOSIS — I10 ESSENTIAL HYPERTENSION, BENIGN: ICD-10-CM

## 2020-10-20 DIAGNOSIS — Z12.5 SCREENING PSA (PROSTATE SPECIFIC ANTIGEN): ICD-10-CM

## 2020-10-20 DIAGNOSIS — E06.3 HYPOTHYROIDISM DUE TO HASHIMOTO'S THYROIDITIS: ICD-10-CM

## 2020-10-20 LAB
A/G RATIO: 2 (ref 1.1–2.2)
ALBUMIN SERPL-MCNC: 4.7 G/DL (ref 3.4–5)
ALP BLD-CCNC: 86 U/L (ref 40–129)
ALT SERPL-CCNC: 24 U/L (ref 10–40)
ANION GAP SERPL CALCULATED.3IONS-SCNC: 10 MMOL/L (ref 3–16)
AST SERPL-CCNC: 26 U/L (ref 15–37)
BASOPHILS ABSOLUTE: 0 K/UL (ref 0–0.2)
BASOPHILS RELATIVE PERCENT: 0.9 %
BILIRUB SERPL-MCNC: 1.4 MG/DL (ref 0–1)
BILIRUBIN URINE: NEGATIVE
BLOOD, URINE: NEGATIVE
BUN BLDV-MCNC: 22 MG/DL (ref 7–20)
CALCIUM SERPL-MCNC: 9.9 MG/DL (ref 8.3–10.6)
CHLORIDE BLD-SCNC: 99 MMOL/L (ref 99–110)
CHOLESTEROL, TOTAL: 192 MG/DL (ref 0–199)
CLARITY: CLEAR
CO2: 30 MMOL/L (ref 21–32)
COLOR: YELLOW
CREAT SERPL-MCNC: 1 MG/DL (ref 0.8–1.3)
EOSINOPHILS ABSOLUTE: 0.1 K/UL (ref 0–0.6)
EOSINOPHILS RELATIVE PERCENT: 2.2 %
GFR AFRICAN AMERICAN: >60
GFR NON-AFRICAN AMERICAN: >60
GLOBULIN: 2.4 G/DL
GLUCOSE BLD-MCNC: 96 MG/DL (ref 70–99)
GLUCOSE URINE: NEGATIVE MG/DL
HCT VFR BLD CALC: 45.2 % (ref 40.5–52.5)
HDLC SERPL-MCNC: 68 MG/DL (ref 40–60)
HEMOGLOBIN: 15 G/DL (ref 13.5–17.5)
KETONES, URINE: NEGATIVE MG/DL
LDL CHOLESTEROL CALCULATED: 90 MG/DL
LEUKOCYTE ESTERASE, URINE: NEGATIVE
LYMPHOCYTES ABSOLUTE: 2.5 K/UL (ref 1–5.1)
LYMPHOCYTES RELATIVE PERCENT: 49.1 %
MCH RBC QN AUTO: 30.2 PG (ref 26–34)
MCHC RBC AUTO-ENTMCNC: 33.1 G/DL (ref 31–36)
MCV RBC AUTO: 91.2 FL (ref 80–100)
MICROSCOPIC EXAMINATION: NORMAL
MONOCYTES ABSOLUTE: 0.4 K/UL (ref 0–1.3)
MONOCYTES RELATIVE PERCENT: 7.7 %
NEUTROPHILS ABSOLUTE: 2 K/UL (ref 1.7–7.7)
NEUTROPHILS RELATIVE PERCENT: 40.1 %
NITRITE, URINE: NEGATIVE
PDW BLD-RTO: 13.4 % (ref 12.4–15.4)
PH UA: 6 (ref 5–8)
PLATELET # BLD: 232 K/UL (ref 135–450)
PMV BLD AUTO: 9.4 FL (ref 5–10.5)
POTASSIUM SERPL-SCNC: 4.1 MMOL/L (ref 3.5–5.1)
PROSTATE SPECIFIC ANTIGEN: 1.74 NG/ML (ref 0–4)
PROTEIN UA: NEGATIVE MG/DL
RBC # BLD: 4.95 M/UL (ref 4.2–5.9)
SODIUM BLD-SCNC: 139 MMOL/L (ref 136–145)
SPECIFIC GRAVITY UA: 1.02 (ref 1–1.03)
TOTAL PROTEIN: 7.1 G/DL (ref 6.4–8.2)
TRIGL SERPL-MCNC: 172 MG/DL (ref 0–150)
TSH SERPL DL<=0.05 MIU/L-ACNC: 1.82 UIU/ML (ref 0.27–4.2)
URINE TYPE: NORMAL
UROBILINOGEN, URINE: 0.2 E.U./DL
VLDLC SERPL CALC-MCNC: 34 MG/DL
WBC # BLD: 5.1 K/UL (ref 4–11)

## 2020-11-05 ENCOUNTER — OFFICE VISIT (OUTPATIENT)
Dept: INTERNAL MEDICINE CLINIC | Age: 70
End: 2020-11-05
Payer: MEDICARE

## 2020-11-05 VITALS
BODY MASS INDEX: 26.07 KG/M2 | SYSTOLIC BLOOD PRESSURE: 144 MMHG | TEMPERATURE: 97.8 F | HEIGHT: 69 IN | DIASTOLIC BLOOD PRESSURE: 97 MMHG | WEIGHT: 176 LBS

## 2020-11-05 PROCEDURE — 93000 ELECTROCARDIOGRAM COMPLETE: CPT | Performed by: INTERNAL MEDICINE

## 2020-11-05 PROCEDURE — G0402 INITIAL PREVENTIVE EXAM: HCPCS | Performed by: INTERNAL MEDICINE

## 2020-11-05 PROCEDURE — 3017F COLORECTAL CA SCREEN DOC REV: CPT | Performed by: INTERNAL MEDICINE

## 2020-11-05 PROCEDURE — 1123F ACP DISCUSS/DSCN MKR DOCD: CPT | Performed by: INTERNAL MEDICINE

## 2020-11-05 PROCEDURE — G8482 FLU IMMUNIZE ORDER/ADMIN: HCPCS | Performed by: INTERNAL MEDICINE

## 2020-11-05 PROCEDURE — 4040F PNEUMOC VAC/ADMIN/RCVD: CPT | Performed by: INTERNAL MEDICINE

## 2020-11-05 RX ORDER — LOSARTAN POTASSIUM AND HYDROCHLOROTHIAZIDE 25; 100 MG/1; MG/1
1 TABLET ORAL DAILY
Qty: 90 TABLET | Refills: 3 | Status: SHIPPED | OUTPATIENT
Start: 2020-11-05 | End: 2020-12-03 | Stop reason: SDUPTHER

## 2020-11-05 RX ORDER — LOSARTAN POTASSIUM AND HYDROCHLOROTHIAZIDE 25; 100 MG/1; MG/1
1 TABLET ORAL DAILY
COMMUNITY
End: 2020-11-05 | Stop reason: SDUPTHER

## 2020-11-05 RX ORDER — FLUTICASONE PROPIONATE 50 MCG
1 SPRAY, SUSPENSION (ML) NASAL DAILY
COMMUNITY

## 2020-11-05 ASSESSMENT — LIFESTYLE VARIABLES
HAVE YOU OR SOMEONE ELSE BEEN INJURED AS A RESULT OF YOUR DRINKING: 0
HOW MANY STANDARD DRINKS CONTAINING ALCOHOL DO YOU HAVE ON A TYPICAL DAY: 0
AUDIT TOTAL SCORE: 2
HOW OFTEN DURING THE LAST YEAR HAVE YOU FAILED TO DO WHAT WAS NORMALLY EXPECTED FROM YOU BECAUSE OF DRINKING: 0
HOW OFTEN DO YOU HAVE SIX OR MORE DRINKS ON ONE OCCASION: 0
HOW OFTEN DO YOU HAVE A DRINK CONTAINING ALCOHOL: 2
HOW OFTEN DURING THE LAST YEAR HAVE YOU HAD A FEELING OF GUILT OR REMORSE AFTER DRINKING: 0
AUDIT-C TOTAL SCORE: 2
HAS A RELATIVE, FRIEND, DOCTOR, OR ANOTHER HEALTH PROFESSIONAL EXPRESSED CONCERN ABOUT YOUR DRINKING OR SUGGESTED YOU CUT DOWN: 0
HOW OFTEN DURING THE LAST YEAR HAVE YOU BEEN UNABLE TO REMEMBER WHAT HAPPENED THE NIGHT BEFORE BECAUSE YOU HAD BEEN DRINKING: 0
HOW OFTEN DURING THE LAST YEAR HAVE YOU FOUND THAT YOU WERE NOT ABLE TO STOP DRINKING ONCE YOU HAD STARTED: 0
HOW OFTEN DURING THE LAST YEAR HAVE YOU NEEDED AN ALCOHOLIC DRINK FIRST THING IN THE MORNING TO GET YOURSELF GOING AFTER A NIGHT OF HEAVY DRINKING: 0

## 2020-11-05 ASSESSMENT — PATIENT HEALTH QUESTIONNAIRE - PHQ9
2. FEELING DOWN, DEPRESSED OR HOPELESS: 0
1. LITTLE INTEREST OR PLEASURE IN DOING THINGS: 0
SUM OF ALL RESPONSES TO PHQ QUESTIONS 1-9: 0
SUM OF ALL RESPONSES TO PHQ9 QUESTIONS 1 & 2: 0

## 2020-11-05 NOTE — PROGRESS NOTES
Medicare Annual Wellness Visit  Name: Balwinder Velarde Date: 2020   MRN: <[id number]> Sex: Male   Age: 79 y.o. Ethnicity: Non-/Non    : 1950 Race: Savannah Holder is here for Medicare AWV    Screenings for behavioral, psychosocial and functional/safety risks, and cognitive dysfunction are all negative except as indicated below. These results, as well as other patient data from the [address] [facility] form, are documented in Flowsheets linked to this Encounter. No Known Allergies    Prior to Visit Medications    Medication Sig Taking? Authorizing Provider   fluticasone (FLONASE) 50 MCG/ACT nasal spray 1 spray by Each Nostril route daily Yes Historical Provider, MD   losartan-hydroCHLOROthiazide (HYZAAR) 100-25 MG per tablet Take 1 tablet by mouth daily Yes Historical Provider, MD   PARoxetine (PAXIL) 40 MG tablet Take 1 tablet every morning Yes Aniyah Hopper MD   pravastatin (PRAVACHOL) 40 MG tablet TAKE 1 TABLET EVERY EVENING Yes Aniyah Hopper MD   levothyroxine (SYNTHROID) 50 MCG tablet TAKE 1 TABLET DAILY Yes Aniyah Hopper MD   fexofenadine (ALLEGRA ALLERGY) 180 MG tablet Take 180 mg by mouth daily Yes Historical Provider, MD   Azelastine-Fluticasone (DYMISTA) 137-50 MCG/ACT SUSP 1 spray by Nasal route daily Left nostril Yes Historical Provider, MD   Cholecalciferol (VITAMIN D) 1000 UNIT CAPS Take  by mouth. Yes Historical Provider, MD   Multiple Vitamin (MULTIVITAMINS PO) Take  by mouth.  Yes Historical Provider, MD       Past Medical History:   Diagnosis Date    Allergic rhinitis, cause unspecified 2010    Backache, unspecified 2010    Depressive disorder, not elsewhere classified 2010    Essential hypertension, benign 2010    Hypothyroid 2014    Myalgia 2012    Other and unspecified hyperlipidemia 2010    Subclinical hypothyroidism 2013       Past Surgical History:   Procedure Laterality Date    UVULOPALATOPHARYGOPLASTY         Family History   Problem Relation Age of Onset    Diabetes Father     Hypertension Father        CareTeam (Including outside providers/suppliers regularly involved in providing care):   Patient Care Team:  Ignacio Canada MD as PCP - General (Internal Medicine)  Ignacio Canada MD [facility]    Wt Readings from Last 3 Encounters:   11/05/20 176 lb (79.8 kg)   09/18/19 174 lb (78.9 kg)   12/06/18 174 lb (78.9 kg)     Vitals:    11/05/20 1405   BP: (!) 144/97   Temp: 97.8 °F (36.6 °C)   Weight: 176 lb (79.8 kg)   Height: 5' 9\" (1.753 m)     Body mass index is 25.99 kg/m². Based upon direct observation of the patient, evaluation of cognition reveals recent and remote memory intact.     General Appearance: alert and oriented to person, place and time, well developed and well- nourished, in no acute distress  Skin: warm and dry, no rash or erythema  Head: normocephalic and atraumatic  Eyes: pupils equal, round, and reactive to light, extraocular eye movements intact, conjunctivae normal  ENT: tympanic membrane, external ear and ear canal normal bilaterally, nose without deformity, nasal mucosa and turbinates normal without polyps  Neck: supple and non-tender without mass, no thyromegaly or thyroid nodules, no cervical lymphadenopathy  Pulmonary/Chest: clear to auscultation bilaterally- no wheezes, rales or rhonchi, normal air movement, no respiratory distress  Cardiovascular: normal rate, regular rhythm, normal S1 and S2, no murmurs, rubs, clicks, or gallops, distal pulses intact, no carotid bruits  Abdomen: soft, non-tender, non-distended, normal bowel sounds, no masses or organomegaly  Extremities: no cyanosis, clubbing or edema  Musculoskeletal: normal range of motion, no joint swelling, deformity or tenderness  Neurologic: reflexes normal and symmetric, no cranial nerve deficit, gait, coordination and speech normal    Patient's complete Health Risk Assessment and Aged Out     Recommendations for Preventive Services Due: see orders and patient instructions/AVS.  . Recommended screening schedule for the next 5-10 years is provided to the patient in written form: see Patient Instructions/AVS.    Brandan Tan was seen today for medicare aw.     Diagnoses and all orders for this visit:    Wellness examination  -     EKG 12 Lead  -     [facility] - Sintia Barraza MD, Gastroenterology, [place]    Essential hypertension, benign  -     EKG 12 Lead    Pure hypercholesterolemia  -     EKG 12 Lead    Encounter for screening colonoscopy

## 2020-11-05 NOTE — PROGRESS NOTES
Annual Wellness Visit     Patient:  Lexy Cloud                                               : 1950  Age: 79 y.o. MRN: <[id number]>  Date : 2020      CHIEF COMPLAINT: Lexy Cloud is a 79 y.o. male who presents for : Physical exam    1. Wellness examination  He feels good denies any chest pain shortness of breath or any other problems and is exercising regularly without problems  - EKG 12 Lead  - [facility] - Parish Vergara MD, Gastroenterology, [place]    2. Essential hypertension, benign  This problem is stable averaging about 130-140/90 80-85 at home  - EKG 12 Lead    3. Pure hypercholesterolemia  No complaints no myalgias  - EKG 12 Lead    4.  Encounter for screening colonoscopy  Get colonoscopy        Patient Active Problem List    Diagnosis Date Noted    JABIER (generalized anxiety disorder) 2018    Hypothyroid 2014    Depressive disorder, not elsewhere classified 2010    Essential hypertension, benign 2010    Allergic rhinitis 2010    Pure hypercholesterolemia 2010       Constitutional:  Denies fever or chills   Eyes:  Denies change in visual acuity   HENT:  Denies nasal congestion or sore throat   Respiratory:  Denies cough or shortness of breath   Cardiovascular:  Denies chest pain or edema   GI:  Denies abdominal pain, nausea, vomiting, bloody stools or diarrhea   :  Denies dysuria   Musculoskeletal:  Denies back pain or joint pain   Integument:  Denies rash   Neurologic:  Denies headache, focal weakness or sensory changes   Endocrine:  Denies polyuria or polydipsia   Lymphatic:  Denies swollen glands   Psychiatric:  Denies depression or anxiety     Past Medical History:        Diagnosis Date    Allergic rhinitis, cause unspecified 2010    Backache, unspecified 2010    Depressive disorder, not elsewhere classified 2010    Essential hypertension, benign 2010    Hypothyroid 2014    Myalgia 2012    Other and unspecified hyperlipidemia 8/18/2010    Subclinical hypothyroidism 8/2/2013       Past Surgical History:        Procedure Laterality Date    UVULOPALATOPHARYGOPLASTY         Family History:  Family History   Problem Relation Age of Onset    Diabetes Father     Hypertension Father        Social History:  Social History     Socioeconomic History    Marital status:      Spouse name: None    Number of children: None    Years of education: None    Highest education level: None   Occupational History    None   Social Needs    Financial resource strain: None    Food insecurity     Worry: None     Inability: None    Transportation needs     Medical: None     Non-medical: None   Tobacco Use    Smoking status: Never Smoker    Smokeless tobacco: Never Used   Substance and Sexual Activity    Alcohol use: Yes     Comment: occasionally    Drug use: None    Sexual activity: None   Lifestyle    Physical activity     Days per week: None     Minutes per session: None    Stress: None   Relationships    Social connections     Talks on phone: None     Gets together: None     Attends Protestant service: None     Active member of club or organization: None     Attends meetings of clubs or organizations: None     Relationship status: None    Intimate partner violence     Fear of current or ex partner: None     Emotionally abused: None     Physically abused: None     Forced sexual activity: None   Other Topics Concern    None   Social History Narrative    None         Allergies:  Patient has no known allergies. Current Medications:    Prior to Admission medications    Medication Sig Start Date End Date Taking?  Authorizing Provider   fluticasone (FLONASE) 50 MCG/ACT nasal spray 1 spray by Each Nostril route daily   Yes Historical Provider, MD   losartan-hydroCHLOROthiazide (HYZAAR) 100-25 MG per tablet Take 1 tablet by mouth daily   Yes Historical Provider, MD   PARoxetine (PAXIL) 40 MG tablet Take 1 tablet every morning 1/29/20  Yes Arash Cain MD   pravastatin (PRAVACHOL) 40 MG tablet TAKE 1 TABLET EVERY EVENING 1/29/20  Yes Arash Cain MD   levothyroxine (SYNTHROID) 50 MCG tablet TAKE 1 TABLET DAILY 1/29/20  Yes Arash Cain MD   fexofenadine TY [facility] ALLERGY) 180 MG tablet Take 180 mg by mouth daily   Yes Historical Provider, MD   Azelastine-Fluticasone (DYMISTA) 137-50 MCG/ACT SUSP 1 spray by Nasal route daily Left nostril   Yes Historical Provider, MD   Cholecalciferol (VITAMIN D) 1000 UNIT CAPS Take  by mouth. Yes Historical Provider, MD   Multiple Vitamin (MULTIVITAMINS PO) Take  by mouth. Yes Historical Provider, MD           Physical Exam:      Constitutional:  Well developed, well nourished, no acute distress, non-toxic appearance   Eyes:  PERRL, conjunctiva normal   HENT:  Atraumatic, external ears normal, nose normal, oropharynx moist, no pharyngeal exudates. Neck- normal range of motion, no tenderness, supple   Respiratory:  No respiratory distress, normal breath sounds, no rales, no wheezing   Cardiovascular:  Normal rate, normal rhythm, no murmurs, no gallops, no rubs   GI:  Soft, nondistended, normal bowel sounds, nontender, no organomegaly, no mass, no rebound, no guarding   :  No costovertebral angle tenderness   Musculoskeletal:  No edema, no tenderness, no deformities. Back- no tenderness  Integument:  Well hydrated, no rash   Lymphatic:  No lymphadenopathy noted   Neurologic:  Alert & oriented x 3, CN 2-12 normal, normal motor function, normal sensory function, no focal deficits noted   Psychiatric:  Speech and behavior appropriate   Rectal exam reveals a slightly large prostate heme-negative stool    Vitals: BP (!) 144/97   Temp 97.8 °F (36.6 °C)   Ht 5' 9\" (1.753 m)   Wt 176 lb (79.8 kg)   BMI 25.99 kg/m²     Body mass index is 25.99 kg/m².   Wt Readings from Last 3 Encounters:   11/05/20 176 lb (79.8 kg)   09/18/19 174 lb (78.9 kg)   12/06/18 174 lb (78.9 kg)         LABS:    CBC: Lab Results   Component Value Date    WBC 5.1 10/20/2020    HGB 15.0 10/20/2020    HCT 45.2 10/20/2020    MCV 91.2 10/20/2020     10/20/2020         No results found for: IRON, TIBC, FERRITIN, FOLATE, [id number], PTH                                                          BMP:    Lab Results   Component Value Date     10/20/2020    K 4.1 10/20/2020    CL 99 10/20/2020    CO2 30 10/20/2020       LFT's:   Lab Results   Component Value Date    ALT 24 10/20/2020    AST 26 10/20/2020    ALKPHOS 86 10/20/2020    BILITOT 1.4 (H) 10/20/2020       Lipids:   Lab Results   Component Value Date    CHOL 192 10/20/2020    HDL 68 (H) 10/20/2020    LDLCALC 90 10/20/2020    TRIG 172 (H) 10/20/2020       INR: No results found for: INR, PROTIME    U/A:  Lab Results   Component Value Date    LABMICR Not Indicated 10/20/2020        No results found for: LABA1C     Lab Results   Component Value Date    CREATININE 1.0 10/20/2020       -----------------------------------------------------------------     Assessment/Plan:   1. Wellness examination  Check screening labs continue diet and exercise he is to get a tetanus shot at the pharmacy  -  [address] Gertrudis Mccarty MD, Gastroenterology, [facility]    2. Essential hypertension, benign  Problem stable continue present meds  - EKG 12 Lead    3. Pure hypercholesterolemia  Problem stable check above labs continue present meds  - EKG 12 Lead    4.  Encounter for screening colonoscopy  Referral for screening colonoscopy

## 2020-12-03 RX ORDER — LOSARTAN POTASSIUM AND HYDROCHLOROTHIAZIDE 25; 100 MG/1; MG/1
1 TABLET ORAL DAILY
Qty: 90 TABLET | Refills: 3 | Status: SHIPPED | OUTPATIENT
Start: 2020-12-03 | End: 2021-12-02 | Stop reason: SDUPTHER

## 2021-01-18 DIAGNOSIS — F41.9 ANXIETY: ICD-10-CM

## 2021-01-18 DIAGNOSIS — R04.0 EPISTAXIS: ICD-10-CM

## 2021-01-18 RX ORDER — PAROXETINE HYDROCHLORIDE 40 MG/1
TABLET, FILM COATED ORAL
Qty: 90 TABLET | Refills: 2 | Status: SHIPPED | OUTPATIENT
Start: 2021-01-18 | End: 2021-12-02 | Stop reason: SDUPTHER

## 2021-02-03 RX ORDER — LEVOTHYROXINE SODIUM 0.05 MG/1
TABLET ORAL
Qty: 90 TABLET | Refills: 2 | Status: SHIPPED | OUTPATIENT
Start: 2021-02-03 | End: 2021-12-02 | Stop reason: SDUPTHER

## 2021-02-03 RX ORDER — PRAVASTATIN SODIUM 40 MG
TABLET ORAL
Qty: 90 TABLET | Refills: 2 | Status: SHIPPED | OUTPATIENT
Start: 2021-02-03 | End: 2021-12-02 | Stop reason: SDUPTHER

## 2021-12-02 DIAGNOSIS — R04.0 EPISTAXIS: ICD-10-CM

## 2021-12-02 DIAGNOSIS — F41.9 ANXIETY: ICD-10-CM

## 2021-12-02 RX ORDER — PAROXETINE HYDROCHLORIDE 40 MG/1
TABLET, FILM COATED ORAL
Qty: 90 TABLET | Refills: 2 | Status: SHIPPED | OUTPATIENT
Start: 2021-12-02

## 2021-12-02 RX ORDER — LOSARTAN POTASSIUM AND HYDROCHLOROTHIAZIDE 25; 100 MG/1; MG/1
1 TABLET ORAL DAILY
Qty: 90 TABLET | Refills: 0 | Status: SHIPPED | OUTPATIENT
Start: 2021-12-02 | End: 2022-03-21

## 2021-12-02 RX ORDER — PRAVASTATIN SODIUM 40 MG
TABLET ORAL
Qty: 90 TABLET | Refills: 2 | Status: SHIPPED | OUTPATIENT
Start: 2021-12-02 | End: 2022-07-26

## 2021-12-02 RX ORDER — LEVOTHYROXINE SODIUM 0.05 MG/1
TABLET ORAL
Qty: 90 TABLET | Refills: 2 | Status: SHIPPED | OUTPATIENT
Start: 2021-12-02

## 2021-12-02 NOTE — TELEPHONE ENCOUNTER
Rx sent, will need appt for additional refills    I spoke with the patient about this.     appt made

## 2021-12-02 NOTE — TELEPHONE ENCOUNTER
Patient called in stating that he is switching mail order pharmacies and needs prescription sent to the pharmacy below:      pravastatin (PRAVACHOL) 40 MG tablet      PARoxetine (PAXIL) 40 MG tablet      losartan-hydroCHLOROthiazide (HYZAAR) 100-25 MG per tablet      levothyroxine (SYNTHROID) 50 MCG tablet      [facility] [address], [address] [place] [address] [phone] [facility] [phone]      Pls advise.

## 2022-02-02 ENCOUNTER — TELEPHONE (OUTPATIENT)
Dept: INTERNAL MEDICINE CLINIC | Age: 72
End: 2022-02-02

## 2022-02-02 DIAGNOSIS — E03.9 HYPOTHYROIDISM, UNSPECIFIED TYPE: ICD-10-CM

## 2022-02-02 DIAGNOSIS — E78.00 PURE HYPERCHOLESTEROLEMIA: ICD-10-CM

## 2022-02-02 DIAGNOSIS — I10 ESSENTIAL HYPERTENSION, BENIGN: Primary | ICD-10-CM

## 2022-02-02 DIAGNOSIS — Z12.5 SCREENING PSA (PROSTATE SPECIFIC ANTIGEN): ICD-10-CM

## 2022-02-02 NOTE — TELEPHONE ENCOUNTER
----- Message from Erum Rivas sent at 2/2/2022  9:15 AM EST -----  Subject: Message to Provider    QUESTIONS  Information for Provider? Patient would like to have his labs done before   physical on 2/22. If an order could be put in.  ---------------------------------------------------------------------------  --------------  CALL BACK INFO  What is the best way for the office to contact you? OK to leave message on   voicemail  Preferred Call Back Phone Number? [phone]  ---------------------------------------------------------------------------  --------------  SCRIPT ANSWERS  Relationship to Patient?  Self

## 2022-02-05 DIAGNOSIS — Z12.5 SCREENING PSA (PROSTATE SPECIFIC ANTIGEN): ICD-10-CM

## 2022-02-05 DIAGNOSIS — I10 ESSENTIAL HYPERTENSION, BENIGN: ICD-10-CM

## 2022-02-05 DIAGNOSIS — E78.00 PURE HYPERCHOLESTEROLEMIA: ICD-10-CM

## 2022-02-05 DIAGNOSIS — E03.9 HYPOTHYROIDISM, UNSPECIFIED TYPE: ICD-10-CM

## 2022-02-05 LAB
A/G RATIO: 1.8 (ref 1.1–2.2)
ALBUMIN SERPL-MCNC: 4.5 G/DL (ref 3.4–5)
ALP BLD-CCNC: 75 U/L (ref 40–129)
ALT SERPL-CCNC: 25 U/L (ref 10–40)
ANION GAP SERPL CALCULATED.3IONS-SCNC: 14 MMOL/L (ref 3–16)
AST SERPL-CCNC: 31 U/L (ref 15–37)
BASOPHILS ABSOLUTE: 0 K/UL (ref 0–0.2)
BASOPHILS RELATIVE PERCENT: 0.6 %
BILIRUB SERPL-MCNC: 1 MG/DL (ref 0–1)
BILIRUBIN URINE: NEGATIVE
BLOOD, URINE: NEGATIVE
BUN BLDV-MCNC: 17 MG/DL (ref 7–20)
CALCIUM SERPL-MCNC: 9.5 MG/DL (ref 8.3–10.6)
CHLORIDE BLD-SCNC: 100 MMOL/L (ref 99–110)
CHOLESTEROL, TOTAL: 159 MG/DL (ref 0–199)
CLARITY: CLEAR
CO2: 27 MMOL/L (ref 21–32)
COLOR: YELLOW
CREAT SERPL-MCNC: 1 MG/DL (ref 0.8–1.3)
EOSINOPHILS ABSOLUTE: 0.2 K/UL (ref 0–0.6)
EOSINOPHILS RELATIVE PERCENT: 2.9 %
GFR AFRICAN AMERICAN: >60
GFR NON-AFRICAN AMERICAN: >60
GLUCOSE BLD-MCNC: 97 MG/DL (ref 70–99)
GLUCOSE URINE: NEGATIVE MG/DL
HCT VFR BLD CALC: 42.1 % (ref 40.5–52.5)
HDLC SERPL-MCNC: 62 MG/DL (ref 40–60)
HEMOGLOBIN: 14.2 G/DL (ref 13.5–17.5)
KETONES, URINE: NEGATIVE MG/DL
LDL CHOLESTEROL CALCULATED: 71 MG/DL
LEUKOCYTE ESTERASE, URINE: NEGATIVE
LYMPHOCYTES ABSOLUTE: 2.6 K/UL (ref 1–5.1)
LYMPHOCYTES RELATIVE PERCENT: 42.9 %
MCH RBC QN AUTO: 31.1 PG (ref 26–34)
MCHC RBC AUTO-ENTMCNC: 33.7 G/DL (ref 31–36)
MCV RBC AUTO: 92.4 FL (ref 80–100)
MICROSCOPIC EXAMINATION: NORMAL
MONOCYTES ABSOLUTE: 0.4 K/UL (ref 0–1.3)
MONOCYTES RELATIVE PERCENT: 7.2 %
NEUTROPHILS ABSOLUTE: 2.8 K/UL (ref 1.7–7.7)
NEUTROPHILS RELATIVE PERCENT: 46.4 %
NITRITE, URINE: NEGATIVE
PDW BLD-RTO: 13.4 % (ref 12.4–15.4)
PH UA: 6.5 (ref 5–8)
PLATELET # BLD: 255 K/UL (ref 135–450)
PMV BLD AUTO: 8.7 FL (ref 5–10.5)
POTASSIUM SERPL-SCNC: 4 MMOL/L (ref 3.5–5.1)
PROSTATE SPECIFIC ANTIGEN: 1.98 NG/ML (ref 0–4)
PROTEIN UA: NEGATIVE MG/DL
RBC # BLD: 4.56 M/UL (ref 4.2–5.9)
SODIUM BLD-SCNC: 141 MMOL/L (ref 136–145)
SPECIFIC GRAVITY UA: 1.03 (ref 1–1.03)
TOTAL PROTEIN: 7 G/DL (ref 6.4–8.2)
TRIGL SERPL-MCNC: 130 MG/DL (ref 0–150)
TSH SERPL DL<=0.05 MIU/L-ACNC: 2.1 UIU/ML (ref 0.27–4.2)
URINE TYPE: NORMAL
UROBILINOGEN, URINE: 0.2 E.U./DL
VLDLC SERPL CALC-MCNC: 26 MG/DL
WBC # BLD: 6 K/UL (ref 4–11)

## 2022-02-22 ENCOUNTER — OFFICE VISIT (OUTPATIENT)
Dept: INTERNAL MEDICINE CLINIC | Age: 72
End: 2022-02-22
Payer: MEDICARE

## 2022-02-22 VITALS
WEIGHT: 175 LBS | HEIGHT: 69 IN | DIASTOLIC BLOOD PRESSURE: 80 MMHG | BODY MASS INDEX: 25.92 KG/M2 | SYSTOLIC BLOOD PRESSURE: 130 MMHG

## 2022-02-22 DIAGNOSIS — D12.5 ADENOMATOUS POLYP OF SIGMOID COLON: ICD-10-CM

## 2022-02-22 DIAGNOSIS — I10 ESSENTIAL HYPERTENSION, BENIGN: ICD-10-CM

## 2022-02-22 DIAGNOSIS — E03.8 HYPOTHYROIDISM DUE TO HASHIMOTO'S THYROIDITIS: ICD-10-CM

## 2022-02-22 DIAGNOSIS — E06.3 HYPOTHYROIDISM DUE TO HASHIMOTO'S THYROIDITIS: ICD-10-CM

## 2022-02-22 DIAGNOSIS — Z00.00 PE (PHYSICAL EXAM), ANNUAL: Primary | ICD-10-CM

## 2022-02-22 DIAGNOSIS — E78.00 PURE HYPERCHOLESTEROLEMIA: ICD-10-CM

## 2022-02-22 DIAGNOSIS — J30.1 ALLERGIC RHINITIS DUE TO POLLEN, UNSPECIFIED SEASONALITY: ICD-10-CM

## 2022-02-22 PROCEDURE — 3017F COLORECTAL CA SCREEN DOC REV: CPT | Performed by: INTERNAL MEDICINE

## 2022-02-22 PROCEDURE — G0439 PPPS, SUBSEQ VISIT: HCPCS | Performed by: INTERNAL MEDICINE

## 2022-02-22 PROCEDURE — 4040F PNEUMOC VAC/ADMIN/RCVD: CPT | Performed by: INTERNAL MEDICINE

## 2022-02-22 PROCEDURE — 1123F ACP DISCUSS/DSCN MKR DOCD: CPT | Performed by: INTERNAL MEDICINE

## 2022-02-22 PROCEDURE — G8484 FLU IMMUNIZE NO ADMIN: HCPCS | Performed by: INTERNAL MEDICINE

## 2022-02-22 SDOH — ECONOMIC STABILITY: FOOD INSECURITY: WITHIN THE PAST 12 MONTHS, THE FOOD YOU BOUGHT JUST DIDN'T LAST AND YOU DIDN'T HAVE MONEY TO GET MORE.: NEVER TRUE

## 2022-02-22 SDOH — ECONOMIC STABILITY: FOOD INSECURITY: WITHIN THE PAST 12 MONTHS, YOU WORRIED THAT YOUR FOOD WOULD RUN OUT BEFORE YOU GOT MONEY TO BUY MORE.: NEVER TRUE

## 2022-02-22 ASSESSMENT — PATIENT HEALTH QUESTIONNAIRE - PHQ9
SUM OF ALL RESPONSES TO PHQ QUESTIONS 1-9: 0
SUM OF ALL RESPONSES TO PHQ QUESTIONS 1-9: 0
2. FEELING DOWN, DEPRESSED OR HOPELESS: 0
SUM OF ALL RESPONSES TO PHQ QUESTIONS 1-9: 0
SUM OF ALL RESPONSES TO PHQ QUESTIONS 1-9: 0
1. LITTLE INTEREST OR PLEASURE IN DOING THINGS: 0
SUM OF ALL RESPONSES TO PHQ9 QUESTIONS 1 & 2: 0

## 2022-02-22 ASSESSMENT — SOCIAL DETERMINANTS OF HEALTH (SDOH): HOW HARD IS IT FOR YOU TO PAY FOR THE VERY BASICS LIKE FOOD, HOUSING, MEDICAL CARE, AND HEATING?: NOT HARD AT ALL

## 2022-02-22 ASSESSMENT — LIFESTYLE VARIABLES
HOW MANY STANDARD DRINKS CONTAINING ALCOHOL DO YOU HAVE ON A TYPICAL DAY: 1 OR 2
HOW OFTEN DO YOU HAVE A DRINK CONTAINING ALCOHOL: 2-4 TIMES A MONTH

## 2022-02-22 NOTE — PROGRESS NOTES
Medicare Annual Wellness Visit    Zayda To is here for Medicare [place]. 57 was seen today for medicare awv. Diagnoses and all orders for this visit:    Allergic rhinitis due to pollen, unspecified seasonality    Essential hypertension, benign    Hypothyroidism due to Hashimoto's thyroiditis    Pure hypercholesterolemia    Adenomatous polyp of sigmoid colon         Recommendations for Preventive Services Due: see orders and patient instructions/AVS.  Recommended screening schedule for the next 5-10 years is provided to the patient in written form: see Patient Instructions/AVS.     No follow-ups on file. Reviewed and updated this visit by clinical staff:  Tobacco  Allergies  Meds  Problems  Med Hx  Surg Hx  Soc Hx  Fam Hx        Subjective       Patient's complete Health Risk Assessment and screening values have been reviewed and are found in Flowsheets. The following problems were reviewed today and where indicated follow up appointments were made and/or referrals ordered.     Positive Risk Factor Screenings with Interventions:             General Health and ACP:       Advance Directives     Power of  Living Will ACP-Advance Directive ACP-Power of     Not on File Not on File Not on File Not on File      General Health Risk Interventions:  · Poor self-assessment of health status: patient declines any further evaluation/treatment for this issue           Objective         General Appearance: alert and oriented to person, place and time, well developed and well- nourished, in no acute distress  Skin: warm and dry, no rash or erythema  Head: normocephalic and atraumatic  Eyes: pupils equal, round, and reactive to light, extraocular eye movements intact, conjunctivae normal  ENT: tympanic membrane, external ear and ear canal normal bilaterally, nose without deformity, nasal mucosa and turbinates normal without polyps  Neck: supple and non-tender without mass, no thyromegaly or thyroid nodules, no cervical lymphadenopathy  Pulmonary/Chest: clear to auscultation bilaterally- no wheezes, rales or rhonchi, normal air movement, no respiratory distress  Cardiovascular: normal rate, regular rhythm, normal S1 and S2, no murmurs, rubs, clicks, or gallops, distal pulses intact, no carotid bruits  Abdomen: soft, non-tender, non-distended, normal bowel sounds, no masses or organomegaly  Extremities: no cyanosis, clubbing or edema  Musculoskeletal: normal range of motion, no joint swelling, deformity or tenderness  Neurologic: reflexes normal and symmetric, no cranial nerve deficit, gait, coordination and speech normal      No Known Allergies  Prior to Visit Medications    Medication Sig Taking? Authorizing Provider   levothyroxine (SYNTHROID) 50 MCG tablet Take 1 tablet by mouth every day Yes Gracy Mendenhall MD   pravastatin (PRAVACHOL) 40 MG tablet Take 1 tablet by mouth every evening Yes Gracy Mendenhall MD   PARoxetine (PAXIL) 40 MG tablet Take 1 tablet every morning Yes Gracy Mendenhall MD   losartan-hydroCHLOROthiazide (HYZAAR) 100-25 MG per tablet Take 1 tablet by mouth daily Yes Gracy Mendenhall MD   fluticasone (FLONASE) 50 MCG/ACT nasal spray 1 spray by Each Nostril route daily Yes Historical Provider, MD   fexofenadine (ALLEGRA ALLERGY) 180 MG tablet Take 180 mg by mouth daily Yes Historical Provider, MD   Cholecalciferol (VITAMIN D) 1000 UNIT CAPS Take  by mouth. Yes Historical Provider, MD   Multiple Vitamin (MULTIVITAMINS PO) Take  by mouth.  Yes Historical Provider, MD [place] (Including outside providers/suppliers regularly involved in providing care):   Patient Care Team:  Gracy Mendenhall MD as PCP - General (Internal Medicine)  Gracy Mendenhall MD as PCP - REHABILITATION [facility] Provider

## 2022-02-22 NOTE — PROGRESS NOTES
Annual Wellness Visit     Patient:  Thalia Bowman                                               : 1950  Age: 70 y.o. MRN: <[id number]>  Date : 2022      CHIEF COMPLAINT: Thalia Bowman is a 70 y.o. male who presents for : Physical exam    1. Allergic rhinitis due to pollen, unspecified seasonality  Problem is stable on present meds    2. Essential hypertension, benign  Blood pressures been controlled at home    3. Hypothyroidism due to Hashimoto's thyroiditis  No symptoms of hypothyroidism    4. Pure hypercholesterolemia  No complaints no myalgias    5.  Adenomatous polyp of sigmoid colon  Colonoscopy this year that showed benign polyps        Patient Active Problem List    Diagnosis Date Noted    Adenomatous polyp of sigmoid colon 2022    JABIER (generalized anxiety disorder) 2018    Hypothyroid 2014    Depressive disorder, not elsewhere classified 2010    Essential hypertension, benign 2010    Allergic rhinitis 2010    Pure hypercholesterolemia 2010       Constitutional:  Denies fever or chills   Eyes:  Denies change in visual acuity   HENT:  Denies nasal congestion or sore throat   Respiratory:  Denies cough or shortness of breath   Cardiovascular:  Denies chest pain or edema   GI:  Denies abdominal pain, nausea, vomiting, bloody stools or diarrhea   :  Denies dysuria   Musculoskeletal:  Denies back pain or joint pain   Integument:  Denies rash   Neurologic:  Denies headache, focal weakness or sensory changes   Endocrine:  Denies polyuria or polydipsia   Lymphatic:  Denies swollen glands   Psychiatric:  Denies depression or anxiety     Past Medical History:        Diagnosis Date    Adenomatous polyp of sigmoid colon 2022    Allergic rhinitis, cause unspecified 2010    Backache, unspecified 2010    Depressive disorder, not elsewhere classified 2010    Essential hypertension, benign 2010    Hypothyroid 2014    Myalgia 9/21/2012    Other and unspecified hyperlipidemia 8/18/2010    Subclinical hypothyroidism 8/2/2013       Past Surgical History:        Procedure Laterality Date    UVULOPALATOPHARYGOPLASTY         Family History:  Family History   Problem Relation Age of Onset    Diabetes Father     Hypertension Father        Social History:  Social History     Socioeconomic History    Marital status:      Spouse name: None    Number of children: None    Years of education: None    Highest education level: None   Occupational History    None   Tobacco Use    Smoking status: Never Smoker    Smokeless tobacco: Never Used   Substance and Sexual Activity    Alcohol use: Yes     Comment: occasionally    Drug use: None    Sexual activity: None   Other Topics Concern    None   Social History Narrative    None     Social Determinants of Health     Financial Resource Strain: Low Risk     Difficulty of Paying Living Expenses: Not hard at all   Food Insecurity: No Food Insecurity    Worried About Running Out of Food in the Last Year: Never true    [place] of Food in the Last Year: Never true   Transportation Needs:     Lack of Transportation (Medical): Not on file    Lack of Transportation (Non-Medical):  Not on file   Physical Activity:     Days of Exercise per Week: Not on file    Minutes of Exercise per Session: Not on file   Stress:     Feeling of Stress : Not on file   Social Connections:     Frequency of Communication with Friends and Family: Not on file    Frequency of Social Gatherings with Friends and Family: Not on file    Attends Christian Services: Not on file    Active Member of Clubs or Organizations: Not on file    Attends Club or Organization Meetings: Not on file    Marital Status: Not on file   Intimate Partner Violence:     Fear of Current or Ex-Partner: Not on file    Emotionally Abused: Not on file    Physically Abused: Not on file    Sexually Abused: Not on file   Housing Stability:     Unable to Pay for Housing in the Last Year: Not on file    Number of Places Lived in the Last Year: Not on file    Unstable Housing in the Last Year: Not on file         Allergies:  Patient has no known allergies. Current Medications:    Prior to Admission medications    Medication Sig Start Date End Date Taking? Authorizing Provider   levothyroxine (SYNTHROID) 50 MCG tablet Take 1 tablet by mouth every day 12/2/21  Yes Cory Cope MD   pravastatin (PRAVACHOL) 40 MG tablet Take 1 tablet by mouth every evening 12/2/21  Yes Cory Cope MD   PARoxetine (PAXIL) 40 MG tablet Take 1 tablet every morning 12/2/21  Yes Cory Cope MD   losartan-hydroCHLOROthiazide [facility]) 100-25 MG per tablet Take 1 tablet by mouth daily 12/2/21  Yes Cory Cope MD   fluticasone [facility]) 50 MCG/ACT nasal spray 1 spray by Each Nostril route daily   Yes Historical Provider, MD   fexofenadine (ALLEGRA ALLERGY) 180 MG tablet Take 180 mg by mouth daily   Yes Historical Provider, MD   Cholecalciferol (VITAMIN D) 1000 UNIT CAPS Take  by mouth. Yes Historical Provider, MD   Multiple Vitamin (MULTIVITAMINS PO) Take  by mouth. Yes Historical Provider, MD           Physical Exam:      Constitutional:  Well developed, well nourished, no acute distress, non-toxic appearance   Eyes:  PERRL, conjunctiva normal   HENT:  Atraumatic, external ears normal, nose normal, oropharynx moist, no pharyngeal exudates. Neck- normal range of motion, no tenderness, supple   Respiratory:  No respiratory distress, normal breath sounds, no rales, no wheezing   Cardiovascular:  Normal rate, normal rhythm, no murmurs, no gallops, no rubs   GI:  Soft, nondistended, normal bowel sounds, nontender, no organomegaly, no mass, no rebound, no guarding   :  No costovertebral angle tenderness   Musculoskeletal:  No edema, no tenderness, no deformities.  Back- no tenderness  Integument:  Well hydrated, no rash   Lymphatic:  No lymphadenopathy noted Neurologic:  Alert & oriented x 3, CN 2-12 normal, normal motor function, normal sensory function, no focal deficits noted   Psychiatric:  Speech and behavior appropriate       Vitals: /80   Ht 5' 9\" (1.753 m)   Wt 175 lb (79.4 kg)   BMI 25.84 kg/m²     Body mass index is 25.84 kg/m². Wt Readings from Last 3 Encounters:   02/22/22 175 lb (79.4 kg)   11/05/20 176 lb (79.8 kg)   09/18/19 174 lb (78.9 kg)         LABS:    CBC:   Lab Results   Component Value Date    WBC 6.0 02/05/2022    HGB 14.2 02/05/2022    HCT 42.1 02/05/2022    MCV 92.4 02/05/2022     02/05/2022         No results found for: IRON, TIBC, FERRITIN, FOLATE, [id number], PTH                                                          BMP:    Lab Results   Component Value Date     02/05/2022    K 4.0 02/05/2022     02/05/2022    CO2 27 02/05/2022       LFT's:   Lab Results   Component Value Date    ALT 25 02/05/2022    AST 31 02/05/2022    ALKPHOS 75 02/05/2022    BILITOT 1.0 02/05/2022       Lipids:   Lab Results   Component Value Date    CHOL 159 02/05/2022    HDL 62 (H) 02/05/2022    LDLCALC 71 02/05/2022    TRIG 130 02/05/2022       INR: No results found for: INR, PROTIME    U/A:  Lab Results   Component Value Date    LABMICR Not Indicated 02/05/2022        No results found for: LABA1C     Lab Results   Component Value Date    CREATININE 1.0 02/05/2022       -----------------------------------------------------------------     Assessment/Plan:   1. Allergic rhinitis due to pollen, unspecified seasonality  This problem is stable continue present meds    2. Essential hypertension, benign  This problem is stable continue present meds    3. Hypothyroidism due to Hashimoto's thyroiditis  Medically euthyroid check above labs continue present meds    4. Pure hypercholesterolemia  This problem is stable continue present meds check above labs    5.  Adenomatous polyp of sigmoid colon  This problem is stable followed by GI continue every 5-year colonoscopy  Physical exam check screening labs he is up-to-date on all his immunizations we will continue diet and exercise

## 2022-03-21 RX ORDER — LOSARTAN POTASSIUM AND HYDROCHLOROTHIAZIDE 25; 100 MG/1; MG/1
TABLET ORAL
Qty: 90 TABLET | Refills: 3 | Status: SHIPPED | OUTPATIENT
Start: 2022-03-21

## 2022-03-21 RX ORDER — LOSARTAN POTASSIUM AND HYDROCHLOROTHIAZIDE 25; 100 MG/1; MG/1
TABLET ORAL
Qty: 90 TABLET | Refills: 3 | Status: SHIPPED | OUTPATIENT
Start: 2022-03-21 | End: 2022-03-21 | Stop reason: SDUPTHER

## 2022-03-21 NOTE — TELEPHONE ENCOUNTER
Pt needs a refill for the following medication:      losartan-hydroCHLOROthiazide (HYZAAR) 100-25 MG per tablet      [address], [address] [facility] [phone] [facility] [phone]          Please advise

## 2022-07-26 RX ORDER — PRAVASTATIN SODIUM 40 MG
TABLET ORAL
Qty: 90 TABLET | Refills: 2 | Status: SHIPPED | OUTPATIENT
Start: 2022-07-26 | End: 2022-08-24 | Stop reason: SDUPTHER

## 2022-08-24 RX ORDER — PRAVASTATIN SODIUM 40 MG
TABLET ORAL
Qty: 90 TABLET | Refills: 3 | Status: SHIPPED | OUTPATIENT
Start: 2022-08-24

## 2022-08-24 NOTE — TELEPHONE ENCOUNTER
Pt needs a refill on pravastatin (PRAVACHOL) 40 MG tablet       Pharm  [address], [address] [facility] [phone] - f [phone]      Please advise

## 2022-10-27 ENCOUNTER — OFFICE VISIT (OUTPATIENT)
Dept: INTERNAL MEDICINE CLINIC | Age: 72
End: 2022-10-27
Payer: MEDICARE

## 2022-10-27 VITALS
HEIGHT: 69 IN | DIASTOLIC BLOOD PRESSURE: 80 MMHG | WEIGHT: 183 LBS | BODY MASS INDEX: 27.11 KG/M2 | TEMPERATURE: 97.7 F | SYSTOLIC BLOOD PRESSURE: 140 MMHG

## 2022-10-27 DIAGNOSIS — M79.10 MYALGIA: Primary | ICD-10-CM

## 2022-10-27 PROCEDURE — 99213 OFFICE O/P EST LOW 20 MIN: CPT | Performed by: INTERNAL MEDICINE

## 2022-10-27 PROCEDURE — 3017F COLORECTAL CA SCREEN DOC REV: CPT | Performed by: INTERNAL MEDICINE

## 2022-10-27 PROCEDURE — 3074F SYST BP LT 130 MM HG: CPT | Performed by: INTERNAL MEDICINE

## 2022-10-27 PROCEDURE — 1036F TOBACCO NON-USER: CPT | Performed by: INTERNAL MEDICINE

## 2022-10-27 PROCEDURE — 1123F ACP DISCUSS/DSCN MKR DOCD: CPT | Performed by: INTERNAL MEDICINE

## 2022-10-27 PROCEDURE — G8484 FLU IMMUNIZE NO ADMIN: HCPCS | Performed by: INTERNAL MEDICINE

## 2022-10-27 PROCEDURE — 3078F DIAST BP <80 MM HG: CPT | Performed by: INTERNAL MEDICINE

## 2022-10-27 PROCEDURE — G8417 CALC BMI ABV UP PARAM F/U: HCPCS | Performed by: INTERNAL MEDICINE

## 2022-10-27 PROCEDURE — G8427 DOCREV CUR MEDS BY ELIG CLIN: HCPCS | Performed by: INTERNAL MEDICINE

## 2022-10-27 NOTE — PROGRESS NOTES
CHIEF COMPLAINT: Lew Patino is a 67 y.o. male who presents for : Right lower quadrant abdominal pain x6 weeks    HPI: Patient presented with complaints of right lower quadrant abdominal pain x6 weeks there is no GI or  symptoms he believes it is secondary to a muscle strain from playing with his dog but wants to make sure there is no hernia there is no swelling noted    Review of Systems:   Constitutional:  Denies fever or chills   Eyes:  Denies change in visual acuity   HENT:  Denies nasal congestion or sore throat   Respiratory:  Denies cough or shortness of breath   Cardiovascular:  Denies chest pain or edema   GI:  Denies abdominal pain, nausea, vomiting, bloody stools or diarrhea   :  Denies dysuria   Musculoskeletal:  Denies back pain or joint pain   Integument:  Denies rash   Neurologic:  Denies headache, focal weakness or sensory changes   Endocrine:  Denies polyuria or polydipsia   Lymphatic:  Denies swollen glands   Psychiatric:  Denies depression or anxiety     Past Medical History:        Diagnosis Date    Adenomatous polyp of sigmoid colon 2/22/2022    Allergic rhinitis, cause unspecified 8/18/2010    Backache, unspecified 8/18/2010    Depressive disorder, not elsewhere classified 8/18/2010    Essential hypertension, benign 8/18/2010    Hypothyroid 8/18/2014    Myalgia 9/21/2012    Other and unspecified hyperlipidemia 8/18/2010    Subclinical hypothyroidism 8/2/2013       Past Surgical History:        Procedure Laterality Date    UVULOPALATOPHARYGOPLASTY         Family History:  Family History   Problem Relation Age of Onset    Diabetes Father     Hypertension Father        Social History:  Social History     Socioeconomic History    Marital status:      Spouse name: None    Number of children: None    Years of education: None    Highest education level: None   Tobacco Use    Smoking status: Never    Smokeless tobacco: Never   Substance and Sexual Activity    Alcohol use:  Yes Comment: occasionally     Social Determinants of Health     Financial Resource Strain: Low Risk     Difficulty of Paying Living Expenses: Not hard at all   Food Insecurity: No Food Insecurity    Worried About Running Out of Food in the Last Year: Never true    Ran Out of Food in the Last Year: Never true   Physical Activity: Sufficiently Active    Days of Exercise per Week: 5 days    Minutes of Exercise per Session: 60 min         Allergies:  Patient has no known allergies. Current Medications:    Prior to Admission medications    Medication Sig Start Date End Date Taking? Authorizing Provider   pravastatin (PRAVACHOL) 40 MG tablet TAKE 1 TABLET EVERY EVENING 8/24/22  Yes Roberta Garland MD   losartan-hydroCHLOROthiazide [facility]) 100-25 MG per tablet TAKE 1 TABLET DAILY 3/21/22  Yes Roberta Garland MD   levothyroxine (SYNTHROID) 50 MCG tablet Take 1 tablet by mouth every day 12/2/21  Yes Roberta Garland MD   PARoxetine (PAXIL) 40 MG tablet Take 1 tablet every morning 12/2/21  Yes Roberta Garland MD   fluticasone [facility]) 50 MCG/ACT nasal spray 1 spray by Each Nostril route daily   Yes Historical Provider, MD   fexofenadine (ALLEGRA) 180 MG tablet Take 180 mg by mouth daily   Yes Historical Provider, MD   Cholecalciferol (VITAMIN D) 1000 UNIT CAPS Take  by mouth. Yes Historical Provider, MD   Multiple Vitamin (MULTIVITAMINS PO) Take  by mouth. Yes Historical Provider, MD       Physical Exam:  Vital Signs: BP (!) 140/80   Temp 97.7 °F (36.5 °C)   Ht 5' 9\" (1.753 m)   Wt 183 lb (83 kg)   BMI 27.02 kg/m²   General: Patient appears  non-toxic  HENT: Atraumatic, normocephalic, oral mucosa moist  Lungs:  Clear bilaterally  Heart: Regular rate and rhythm  Abdomen: Non-distended, soft, slight tenderness in the right lower quadrant no hernia felt  Extremities: No edema  Neuro: Nonfocal   is negative for hernia  Medical Decision Making and Plan:  Pertinent Labs & Imaging studies reviewed.  (See chart for details)  Abdominal pain probably is musculoskeletal secondary to pulled muscle placed on Voltaren cream rest and call if symptoms not resolve

## 2022-11-23 ENCOUNTER — TELEPHONE (OUTPATIENT)
Dept: INTERNAL MEDICINE CLINIC | Age: 72
End: 2022-11-23

## 2022-11-23 DIAGNOSIS — M54.50 LOW BACK PAIN, UNSPECIFIED BACK PAIN LATERALITY, UNSPECIFIED CHRONICITY, UNSPECIFIED WHETHER SCIATICA PRESENT: ICD-10-CM

## 2022-11-23 DIAGNOSIS — M54.9 BACK PAIN, UNSPECIFIED BACK LOCATION, UNSPECIFIED BACK PAIN LATERALITY, UNSPECIFIED CHRONICITY: Primary | ICD-10-CM

## 2022-11-23 NOTE — TELEPHONE ENCOUNTER
Patient is calling stating that he is having back pain and would like to be seen at [address]. Patient stated that they need an order for physical therapy. Fax number [phone]    Myrna advise.

## 2022-12-12 ENCOUNTER — TELEPHONE (OUTPATIENT)
Dept: INTERNAL MEDICINE CLINIC | Age: 72
End: 2022-12-12

## 2022-12-12 DIAGNOSIS — M54.50 LOW BACK PAIN, UNSPECIFIED BACK PAIN LATERALITY, UNSPECIFIED CHRONICITY, UNSPECIFIED WHETHER SCIATICA PRESENT: Primary | ICD-10-CM

## 2022-12-12 NOTE — TELEPHONE ENCOUNTER
----- Message from Aubree Reyes sent at 12/12/2022 11:14 AM EST -----  Subject: Message to Provider    QUESTIONS  Information for Provider? Kiah Chan from [employer] is calling to get a   order refaxed to #[phone] for physical therapy on lumbar  ---------------------------------------------------------------------------  --------------  CALL BACK INFO  [phone]; OK to leave message on voicemail  ---------------------------------------------------------------------------  --------------  SCRIPT ANSWERS  Relationship to Patient? Third Party  Third Party Type? Physician Office? Representative Name?  [facility]

## 2022-12-27 ENCOUNTER — TELEPHONE (OUTPATIENT)
Dept: INTERNAL MEDICINE CLINIC | Age: 72
End: 2022-12-27

## 2022-12-27 DIAGNOSIS — E03.8 HYPOTHYROIDISM DUE TO HASHIMOTO'S THYROIDITIS: ICD-10-CM

## 2022-12-27 DIAGNOSIS — Z00.00 PE (PHYSICAL EXAM), ANNUAL: ICD-10-CM

## 2022-12-27 DIAGNOSIS — I10 ESSENTIAL HYPERTENSION, BENIGN: Primary | ICD-10-CM

## 2022-12-27 DIAGNOSIS — E06.3 HYPOTHYROIDISM DUE TO HASHIMOTO'S THYROIDITIS: ICD-10-CM

## 2022-12-27 DIAGNOSIS — Z12.5 SCREENING PSA (PROSTATE SPECIFIC ANTIGEN): ICD-10-CM

## 2022-12-27 DIAGNOSIS — E03.9 HYPOTHYROIDISM, UNSPECIFIED TYPE: ICD-10-CM

## 2022-12-27 DIAGNOSIS — E78.00 PURE HYPERCHOLESTEROLEMIA: ICD-10-CM

## 2022-12-27 NOTE — TELEPHONE ENCOUNTER
----- Message from Ju Spring sent at 12/27/2022  1:21 PM EST -----  Subject: Referral Request    Reason for referral request? Patient would like orders for blood work on   file so he can complete those prior to his [facility] appointment so can go over   the results on 3/20 with Dr. Destini Tejeda  Provider patient wants to be referred to(if known):     Provider Phone Number(if known):     Additional Information for Provider?   ---------------------------------------------------------------------------  --------------  [address]    [phone]; OK to leave message on voicemail  ---------------------------------------------------------------------------  --------------

## 2023-01-16 ENCOUNTER — TELEMEDICINE (OUTPATIENT)
Dept: PRIMARY CARE CLINIC | Age: 73
End: 2023-01-16
Payer: MEDICARE

## 2023-01-16 DIAGNOSIS — J30.2 SEASONAL ALLERGIES: Primary | ICD-10-CM

## 2023-01-16 DIAGNOSIS — R09.82 POST-NASAL DRIP: ICD-10-CM

## 2023-01-16 PROCEDURE — 1123F ACP DISCUSS/DSCN MKR DOCD: CPT | Performed by: NURSE PRACTITIONER

## 2023-01-16 PROCEDURE — 3017F COLORECTAL CA SCREEN DOC REV: CPT | Performed by: NURSE PRACTITIONER

## 2023-01-16 PROCEDURE — G8427 DOCREV CUR MEDS BY ELIG CLIN: HCPCS | Performed by: NURSE PRACTITIONER

## 2023-01-16 PROCEDURE — 99213 OFFICE O/P EST LOW 20 MIN: CPT | Performed by: NURSE PRACTITIONER

## 2023-01-16 RX ORDER — AZELASTINE 1 MG/ML
2 SPRAY, METERED NASAL 2 TIMES DAILY
Qty: 120 ML | Refills: 0 | Status: SHIPPED | OUTPATIENT
Start: 2023-01-16

## 2023-01-16 RX ORDER — FLUTICASONE PROPIONATE 50 MCG
2 SPRAY, SUSPENSION (ML) NASAL DAILY
Qty: 16 G | Refills: 0 | Status: SHIPPED | OUTPATIENT
Start: 2023-01-16 | End: 2023-01-16 | Stop reason: SDUPTHER

## 2023-01-16 RX ORDER — FLUTICASONE PROPIONATE 50 MCG
2 SPRAY, SUSPENSION (ML) NASAL DAILY
Qty: 16 G | Refills: 0 | Status: SHIPPED | OUTPATIENT
Start: 2023-01-16

## 2023-01-16 RX ORDER — AZELASTINE 1 MG/ML
2 SPRAY, METERED NASAL 2 TIMES DAILY
Qty: 120 ML | Refills: 0 | Status: SHIPPED | OUTPATIENT
Start: 2023-01-16 | End: 2023-01-16 | Stop reason: SDUPTHER

## 2023-01-16 NOTE — PROGRESS NOTES
Dalton Frye (:  1950) is a Established patient, here for evaluation of the following:    ASSESSMENT/PLAN:  Below is the assessment and plan developed based on review of pertinent history, physical exam, labs, studies, and medications. 1. Seasonal allergies  Allegra, mucinex, flonase as needed  2. Post-nasal drip  -     azelastine (ASTELIN) 0.1 % nasal spray; 2 sprays by Nasal route 2 times daily Use in each nostril as directed, Disp-120 mL, R-0Normal  -     fluticasone (FLONASE) 50 MCG/ACT nasal spray; 2 sprays by Each Nostril route daily, Disp-16 g, R-0Normal  Return if symptoms worsen or fail to improve. SUBJECTIVE/OBJECTIVE:  Patient has been having issues with allergies since November. He was treatd with doxycycline at that time. He currently has a mild cough that is not productive. He had PND on Thursday. He has been clogged in sinuses. He says he does not feel poorly. He denies fever. He is using allegra, nasamist, flonase, and astelin spray. Message virtualist if he begins to feel poorly. Review of Systems    No flowsheet data found.       Physical Exam    [INSTRUCTIONS:  \"[x]\" Indicates a positive item  \"[]\" Indicates a negative item  -- DELETE ALL ITEMS NOT EXAMINED]    Constitutional: [x] Appears well-developed and well-nourished [x] No apparent distress      [] Abnormal -     Mental status: [x] Alert and awake  [x] Oriented to person/place/time [x] Able to follow commands    [] Abnormal -     Eyes:   EOM    [x]  Normal    [] Abnormal -   Sclera  [x]  Normal    [] Abnormal -          Discharge [x]  None visible   [] Abnormal -     HENT: [x] Normocephalic, atraumatic  [] Abnormal -   [x] Mouth/Throat: Mucous membranes are moist    External Ears [x] Normal  [] Abnormal -    Neck: [x] No visualized mass [] Abnormal -     Pulmonary/Chest: [x] Respiratory effort normal   [x] No visualized signs of difficulty breathing or respiratory distress        [] Abnormal -      Musculoskeletal:   [x] Normal gait with no signs of ataxia         [x] Normal range of motion of neck        [] Abnormal -     Neurological:        [x] No Facial Asymmetry (Cranial nerve 7 motor function) (limited exam due to video visit)          [x] No gaze palsy        [] Abnormal -          Skin:        [x] No significant exanthematous lesions or discoloration noted on facial skin         [] Abnormal -            Psychiatric:       [x] Normal Affect [] Abnormal -       Other pertinent observable physical exam findings:-      On this date 1/16/2023 I have spent 20 minutes reviewing previous notes, test results and face to face (virtual) with the patient discussing the diagnosis and importance of compliance with the treatment plan as well as documenting on the day of the visit. Brigitte Pineda, was evaluated through a synchronous (real-time) audio-video encounter. The patient (or guardian if applicable) is aware that this is a billable service, which includes applicable co-pays. This Virtual Visit was conducted with patient's (and/or legal guardian's) consent. The visit was conducted pursuant to the emergency declaration under the [address] [place] [address], [address] [place] [address] authority and the [employer] and [email or web address] General Act. Patient identification was verified, and a caregiver was present when appropriate. The patient was located at home in a state where the provider was licensed to provide care.     --JULIANNE Luna

## 2023-01-25 RX ORDER — LOSARTAN POTASSIUM AND HYDROCHLOROTHIAZIDE 25; 100 MG/1; MG/1
TABLET ORAL
Qty: 90 TABLET | Refills: 3 | Status: SHIPPED | OUTPATIENT
Start: 2023-01-25

## 2023-02-07 DIAGNOSIS — R04.0 EPISTAXIS: ICD-10-CM

## 2023-02-07 DIAGNOSIS — F41.9 ANXIETY: ICD-10-CM

## 2023-02-07 RX ORDER — LEVOTHYROXINE SODIUM 0.05 MG/1
TABLET ORAL
Qty: 90 TABLET | Refills: 2 | Status: SHIPPED | OUTPATIENT
Start: 2023-02-07

## 2023-02-07 RX ORDER — PAROXETINE HYDROCHLORIDE 40 MG/1
TABLET, FILM COATED ORAL
Qty: 90 TABLET | Refills: 2 | Status: SHIPPED | OUTPATIENT
Start: 2023-02-07

## 2023-02-16 DIAGNOSIS — R09.82 POST-NASAL DRIP: ICD-10-CM

## 2023-02-16 RX ORDER — FLUTICASONE PROPIONATE 50 MCG
2 SPRAY, SUSPENSION (ML) NASAL DAILY
Qty: 3 EACH | Refills: 3 | Status: SHIPPED | OUTPATIENT
Start: 2023-02-16

## 2023-02-16 NOTE — TELEPHONE ENCOUNTER
Patient needs refill for the following:      fluticasone (FLONASE) 50 MCG/ACT nasal spray      Patient needs 3 month supply. [address], [address] [place], [place] [address] [phone] - F [phone]      Pls advise.

## 2023-03-15 DIAGNOSIS — E03.9 HYPOTHYROIDISM, UNSPECIFIED TYPE: ICD-10-CM

## 2023-03-15 DIAGNOSIS — E06.3 HYPOTHYROIDISM DUE TO HASHIMOTO'S THYROIDITIS: ICD-10-CM

## 2023-03-15 DIAGNOSIS — I10 ESSENTIAL HYPERTENSION, BENIGN: ICD-10-CM

## 2023-03-15 DIAGNOSIS — Z00.00 PE (PHYSICAL EXAM), ANNUAL: ICD-10-CM

## 2023-03-15 DIAGNOSIS — E78.00 PURE HYPERCHOLESTEROLEMIA: ICD-10-CM

## 2023-03-15 DIAGNOSIS — Z12.5 SCREENING PSA (PROSTATE SPECIFIC ANTIGEN): ICD-10-CM

## 2023-03-15 DIAGNOSIS — E03.8 HYPOTHYROIDISM DUE TO HASHIMOTO'S THYROIDITIS: ICD-10-CM

## 2023-03-15 LAB
ALBUMIN SERPL-MCNC: 4.6 G/DL (ref 3.4–5)
ALBUMIN/GLOB SERPL: 1.6 {RATIO} (ref 1.1–2.2)
ALP SERPL-CCNC: 85 U/L (ref 40–129)
ALT SERPL-CCNC: 27 U/L (ref 10–40)
ANION GAP SERPL CALCULATED.3IONS-SCNC: 14 MMOL/L (ref 3–16)
AST SERPL-CCNC: 27 U/L (ref 15–37)
BASOPHILS # BLD: 0.1 K/UL (ref 0–0.2)
BASOPHILS NFR BLD: 1 %
BILIRUB SERPL-MCNC: 1 MG/DL (ref 0–1)
BILIRUB UR QL STRIP.AUTO: NEGATIVE
BUN SERPL-MCNC: 22 MG/DL (ref 7–20)
CALCIUM SERPL-MCNC: 10 MG/DL (ref 8.3–10.6)
CHLORIDE SERPL-SCNC: 101 MMOL/L (ref 99–110)
CHOLEST SERPL-MCNC: 177 MG/DL (ref 0–199)
CLARITY UR: CLEAR
CO2 SERPL-SCNC: 26 MMOL/L (ref 21–32)
COLOR UR: YELLOW
CREAT SERPL-MCNC: 1.1 MG/DL (ref 0.8–1.3)
DEPRECATED RDW RBC AUTO: 14.1 % (ref 12.4–15.4)
EOSINOPHIL # BLD: 0.2 K/UL (ref 0–0.6)
EOSINOPHIL NFR BLD: 2.2 %
GFR SERPLBLD CREATININE-BSD FMLA CKD-EPI: >60 ML/MIN/{1.73_M2}
GLUCOSE SERPL-MCNC: 97 MG/DL (ref 70–99)
GLUCOSE UR STRIP.AUTO-MCNC: NEGATIVE MG/DL
HCT VFR BLD AUTO: 44.3 % (ref 40.5–52.5)
HDLC SERPL-MCNC: 67 MG/DL (ref 40–60)
HGB BLD-MCNC: 15 G/DL (ref 13.5–17.5)
HGB UR QL STRIP.AUTO: NEGATIVE
KETONES UR STRIP.AUTO-MCNC: NEGATIVE MG/DL
LDLC SERPL CALC-MCNC: 84 MG/DL
LEUKOCYTE ESTERASE UR QL STRIP.AUTO: NEGATIVE
LYMPHOCYTES # BLD: 3.2 K/UL (ref 1–5.1)
LYMPHOCYTES NFR BLD: 45 %
MCH RBC QN AUTO: 31.4 PG (ref 26–34)
MCHC RBC AUTO-ENTMCNC: 33.8 G/DL (ref 31–36)
MCV RBC AUTO: 92.7 FL (ref 80–100)
MONOCYTES # BLD: 0.6 K/UL (ref 0–1.3)
MONOCYTES NFR BLD: 8.9 %
NEUTROPHILS # BLD: 3.1 K/UL (ref 1.7–7.7)
NEUTROPHILS NFR BLD: 42.9 %
NITRITE UR QL STRIP.AUTO: NEGATIVE
PH UR STRIP.AUTO: 6.5 [PH] (ref 5–8)
PLATELET # BLD AUTO: 270 K/UL (ref 135–450)
PMV BLD AUTO: 8.8 FL (ref 5–10.5)
POTASSIUM SERPL-SCNC: 4.4 MMOL/L (ref 3.5–5.1)
PROT SERPL-MCNC: 7.4 G/DL (ref 6.4–8.2)
PROT UR STRIP.AUTO-MCNC: NEGATIVE MG/DL
PSA SERPL DL<=0.01 NG/ML-MCNC: 2.6 NG/ML (ref 0–4)
RBC # BLD AUTO: 4.78 M/UL (ref 4.2–5.9)
SODIUM SERPL-SCNC: 141 MMOL/L (ref 136–145)
SP GR UR STRIP.AUTO: 1.02 (ref 1–1.03)
TRIGL SERPL-MCNC: 130 MG/DL (ref 0–150)
TSH SERPL DL<=0.005 MIU/L-ACNC: 2.77 UIU/ML (ref 0.27–4.2)
UA COMPLETE W REFLEX CULTURE PNL UR: NORMAL
UA DIPSTICK W REFLEX MICRO PNL UR: NORMAL
URN SPEC COLLECT METH UR: NORMAL
UROBILINOGEN UR STRIP-ACNC: 0.2 E.U./DL
VLDLC SERPL CALC-MCNC: 26 MG/DL
WBC # BLD AUTO: 7.2 K/UL (ref 4–11)

## 2023-03-18 SDOH — HEALTH STABILITY: PHYSICAL HEALTH: ON AVERAGE, HOW MANY MINUTES DO YOU ENGAGE IN EXERCISE AT THIS LEVEL?: 50 MIN

## 2023-03-18 SDOH — HEALTH STABILITY: PHYSICAL HEALTH: ON AVERAGE, HOW MANY DAYS PER WEEK DO YOU ENGAGE IN MODERATE TO STRENUOUS EXERCISE (LIKE A BRISK WALK)?: 6 DAYS

## 2023-03-18 SDOH — ECONOMIC STABILITY: INCOME INSECURITY: HOW HARD IS IT FOR YOU TO PAY FOR THE VERY BASICS LIKE FOOD, HOUSING, MEDICAL CARE, AND HEATING?: NOT VERY HARD

## 2023-03-18 SDOH — ECONOMIC STABILITY: TRANSPORTATION INSECURITY
IN THE PAST 12 MONTHS, HAS LACK OF TRANSPORTATION KEPT YOU FROM MEETINGS, WORK, OR FROM GETTING THINGS NEEDED FOR DAILY LIVING?: NO

## 2023-03-18 SDOH — ECONOMIC STABILITY: HOUSING INSECURITY
IN THE LAST 12 MONTHS, WAS THERE A TIME WHEN YOU DID NOT HAVE A STEADY PLACE TO SLEEP OR SLEPT IN A SHELTER (INCLUDING NOW)?: NO

## 2023-03-18 SDOH — ECONOMIC STABILITY: FOOD INSECURITY: WITHIN THE PAST 12 MONTHS, THE FOOD YOU BOUGHT JUST DIDN'T LAST AND YOU DIDN'T HAVE MONEY TO GET MORE.: NEVER TRUE

## 2023-03-18 SDOH — ECONOMIC STABILITY: FOOD INSECURITY: WITHIN THE PAST 12 MONTHS, YOU WORRIED THAT YOUR FOOD WOULD RUN OUT BEFORE YOU GOT MONEY TO BUY MORE.: NEVER TRUE

## 2023-03-18 ASSESSMENT — PATIENT HEALTH QUESTIONNAIRE - PHQ9
1. LITTLE INTEREST OR PLEASURE IN DOING THINGS: 0
9. THOUGHTS THAT YOU WOULD BE BETTER OFF DEAD, OR OF HURTING YOURSELF: 0
5. POOR APPETITE OR OVEREATING: 0
SUM OF ALL RESPONSES TO PHQ9 QUESTIONS 1 & 2: 0
SUM OF ALL RESPONSES TO PHQ QUESTIONS 1-9: 0
7. TROUBLE CONCENTRATING ON THINGS, SUCH AS READING THE NEWSPAPER OR WATCHING TELEVISION: 0
3. TROUBLE FALLING OR STAYING ASLEEP: 0
4. FEELING TIRED OR HAVING LITTLE ENERGY: 0
SUM OF ALL RESPONSES TO PHQ QUESTIONS 1-9: 0
SUM OF ALL RESPONSES TO PHQ QUESTIONS 1-9: 0
2. FEELING DOWN, DEPRESSED OR HOPELESS: 0
SUM OF ALL RESPONSES TO PHQ QUESTIONS 1-9: 0
6. FEELING BAD ABOUT YOURSELF - OR THAT YOU ARE A FAILURE OR HAVE LET YOURSELF OR YOUR FAMILY DOWN: 0
10. IF YOU CHECKED OFF ANY PROBLEMS, HOW DIFFICULT HAVE THESE PROBLEMS MADE IT FOR YOU TO DO YOUR WORK, TAKE CARE OF THINGS AT HOME, OR GET ALONG WITH OTHER PEOPLE: 0
8. MOVING OR SPEAKING SO SLOWLY THAT OTHER PEOPLE COULD HAVE NOTICED. OR THE OPPOSITE, BEING SO FIGETY OR RESTLESS THAT YOU HAVE BEEN MOVING AROUND A LOT MORE THAN USUAL: 0

## 2023-03-18 ASSESSMENT — LIFESTYLE VARIABLES
HOW MANY STANDARD DRINKS CONTAINING ALCOHOL DO YOU HAVE ON A TYPICAL DAY: 1 OR 2
HOW OFTEN DO YOU HAVE A DRINK CONTAINING ALCOHOL: 2-4 TIMES A MONTH
HOW OFTEN DO YOU HAVE A DRINK CONTAINING ALCOHOL: 3
HOW OFTEN DO YOU HAVE SIX OR MORE DRINKS ON ONE OCCASION: 1
HOW MANY STANDARD DRINKS CONTAINING ALCOHOL DO YOU HAVE ON A TYPICAL DAY: 1

## 2023-03-20 ENCOUNTER — OFFICE VISIT (OUTPATIENT)
Dept: INTERNAL MEDICINE CLINIC | Age: 73
End: 2023-03-20

## 2023-03-20 VITALS
DIASTOLIC BLOOD PRESSURE: 100 MMHG | BODY MASS INDEX: 26.36 KG/M2 | WEIGHT: 178 LBS | SYSTOLIC BLOOD PRESSURE: 156 MMHG | HEIGHT: 69 IN

## 2023-03-20 DIAGNOSIS — E78.00 PURE HYPERCHOLESTEROLEMIA: ICD-10-CM

## 2023-03-20 DIAGNOSIS — E03.8 HYPOTHYROIDISM DUE TO HASHIMOTO'S THYROIDITIS: ICD-10-CM

## 2023-03-20 DIAGNOSIS — D12.6 ADENOMATOUS POLYP OF COLON, UNSPECIFIED PART OF COLON: ICD-10-CM

## 2023-03-20 DIAGNOSIS — I10 ESSENTIAL HYPERTENSION, BENIGN: ICD-10-CM

## 2023-03-20 DIAGNOSIS — D12.5 ADENOMATOUS POLYP OF SIGMOID COLON: Primary | ICD-10-CM

## 2023-03-20 DIAGNOSIS — F41.1 GAD (GENERALIZED ANXIETY DISORDER): ICD-10-CM

## 2023-03-20 DIAGNOSIS — E06.3 HYPOTHYROIDISM DUE TO HASHIMOTO'S THYROIDITIS: ICD-10-CM

## 2023-03-20 RX ORDER — LEVOCETIRIZINE DIHYDROCHLORIDE 5 MG/1
5 TABLET, FILM COATED ORAL NIGHTLY
COMMUNITY

## 2023-03-20 ASSESSMENT — PATIENT HEALTH QUESTIONNAIRE - PHQ9
SUM OF ALL RESPONSES TO PHQ QUESTIONS 1-9: 0
1. LITTLE INTEREST OR PLEASURE IN DOING THINGS: 0
SUM OF ALL RESPONSES TO PHQ QUESTIONS 1-9: 0
SUM OF ALL RESPONSES TO PHQ QUESTIONS 1-9: 0
SUM OF ALL RESPONSES TO PHQ9 QUESTIONS 1 & 2: 0
2. FEELING DOWN, DEPRESSED OR HOPELESS: 0
SUM OF ALL RESPONSES TO PHQ QUESTIONS 1-9: 0

## 2023-03-20 NOTE — PROGRESS NOTES
murmurs, rubs, clicks, or gallops, distal pulses intact, no carotid bruits  Abdomen: soft, non-tender, non-distended, normal bowel sounds, no masses or organomegaly  Extremities: no cyanosis, clubbing or edema  Musculoskeletal: normal range of motion, no joint swelling, deformity or tenderness  Neurologic: reflexes normal and symmetric, no cranial nerve deficit, gait, coordination and speech normal       No Known Allergies  Prior to Visit Medications    Medication Sig Taking? Authorizing Provider   levocetirizine (XYZAL) 5 MG tablet Take 5 mg by mouth nightly Yes Historical Provider, MD   Azelastine HCl (ASTEPRO NA) by Nasal route Yes Historical Provider, MD   fluticasone (VERAMYST) 27.5 MCG/SPRAY nasal spray 2 sprays by Each Nostril route daily Yes Historical Provider, MD   levothyroxine (SYNTHROID) 50 MCG tablet TAKE 1 TABLET DAILY Yes Alexandra Dyer MD   PARoxetine (PAXIL) 40 MG tablet TAKE 1 TABLET EVERY MORNING Yes Alexandra Dyer MD   losartan-hydroCHLOROthiazide (HYZAAR) 100-25 MG per tablet TAKE 1 TABLET DAILY Yes Alexandra Dyer MD   pravastatin (PRAVACHOL) 40 MG tablet TAKE 1 TABLET EVERY EVENING Yes Alexandra Dyer MD   Cholecalciferol (VITAMIN D) 1000 UNIT CAPS Take  by mouth. Yes Historical Provider, MD   Multiple Vitamin (MULTIVITAMINS PO) Take  by mouth.  Yes Historical Provider, MD [place] (Including outside providers/suppliers regularly involved in providing care):   Patient Care Team:  Alexandra Dyer MD as PCP - General (Internal Medicine)  Alexandra Dyer MD as PCP - Empaneled Provider     Reviewed and updated this visit:  Tobacco  Allergies  Meds  Problems  Med Hx  Surg Hx  Soc Hx  Fam Hx               Alexandra Dyer MD
tablet TAKE 1 TABLET DAILY 2/7/23  Yes Diana Bañuelos MD   PARoxetine (PAXIL) 40 MG tablet TAKE 1 TABLET EVERY MORNING 2/7/23  Yes Diana Bañuelos MD   losartan-hydroCHLOROthiazide [facility]) 100-25 MG per tablet TAKE 1 TABLET DAILY 1/25/23  Yes Diana Bañuelos MD   pravastatin (PRAVACHOL) 40 MG tablet TAKE 1 TABLET EVERY EVENING 8/24/22  Yes Diana Bañuelos MD   Cholecalciferol (VITAMIN D) 1000 UNIT CAPS Take  by mouth. Yes Historical Provider, MD   Multiple Vitamin (MULTIVITAMINS PO) Take  by mouth. Yes Historical Provider, MD           Physical Exam:      Constitutional:  Well developed, well nourished, no acute distress, non-toxic appearance   Eyes:  PERRL, conjunctiva normal   HENT:  Atraumatic, external ears normal, nose normal, oropharynx moist, no pharyngeal exudates. Neck- normal range of motion, no tenderness, supple   Respiratory:  No respiratory distress, normal breath sounds, no rales, no wheezing   Cardiovascular:  Normal rate, normal rhythm, no murmurs, no gallops, no rubs   GI:  Soft, nondistended, normal bowel sounds, nontender, no organomegaly, no mass, no rebound, no guarding   :  No costovertebral angle tenderness   Musculoskeletal:  No edema, no tenderness, no deformities. Back- no tenderness  Integument:  Well hydrated, no rash   Lymphatic:  No lymphadenopathy noted   Neurologic:  Alert & oriented x 3, CN 2-12 normal, normal motor function, normal sensory function, no focal deficits noted   Psychiatric:  Speech and behavior appropriate   Rectal exam reveals slightly enlarged prostate no nodularity heme-negative stool    Vitals: BP (!) 156/100   Ht 5' 9\" (1.753 m)   Wt 178 lb (80.7 kg)   BMI 26.29 kg/m²     Body mass index is 26.29 kg/m².   Wt Readings from Last 3 Encounters:   03/20/23 178 lb (80.7 kg)   10/27/22 183 lb (83 kg)   02/22/22 175 lb (79.4 kg)         LABS:    CBC:   Lab Results   Component Value Date    WBC 7.2 03/15/2023    HGB 15.0 03/15/2023    HCT 44.3 03/15/2023    MCV 92.7

## 2023-08-10 DIAGNOSIS — R04.0 EPISTAXIS: ICD-10-CM

## 2023-08-10 DIAGNOSIS — F41.9 ANXIETY: ICD-10-CM

## 2023-08-10 RX ORDER — PAROXETINE HYDROCHLORIDE 40 MG/1
40 TABLET, FILM COATED ORAL EVERY MORNING
Qty: 90 TABLET | Refills: 1 | Status: SHIPPED | OUTPATIENT
Start: 2023-08-10

## 2023-08-10 RX ORDER — PRAVASTATIN SODIUM 40 MG
TABLET ORAL
Qty: 90 TABLET | Refills: 1 | Status: SHIPPED | OUTPATIENT
Start: 2023-08-10

## 2023-08-10 RX ORDER — LOSARTAN POTASSIUM AND HYDROCHLOROTHIAZIDE 25; 100 MG/1; MG/1
1 TABLET ORAL DAILY
Qty: 90 TABLET | Refills: 1 | Status: SHIPPED | OUTPATIENT
Start: 2023-08-10

## 2023-08-10 RX ORDER — LEVOTHYROXINE SODIUM 0.05 MG/1
TABLET ORAL
Qty: 90 TABLET | Refills: 1 | Status: SHIPPED | OUTPATIENT
Start: 2023-08-10

## 2023-09-18 NOTE — TELEPHONE ENCOUNTER
Starting feeling bad last Tuesday 9/12/23, drip down throat, coughing, sneezing, low fever. He tested Positive for Covid this morning. He did not test last week because he thought it was a cold. He said he is starting to feel better but would like someone to call him.     Please call and advise     [facility] [id number] [facility], [facility] [address] - F [phone]

## 2024-01-04 ENCOUNTER — TELEPHONE (OUTPATIENT)
Dept: INTERNAL MEDICINE CLINIC | Age: 74
End: 2024-01-04

## 2024-01-04 NOTE — TELEPHONE ENCOUNTER
----- Message from Esme Ho sent at 1/3/2024  9:41 AM EST -----  Subject: Referral Request    Reason for referral request? Labs   Provider patient wants to be referred to(if known):     Provider Phone Number(if known):    Additional Information for Provider? Mandeep Siddiqi would like to have   orders completed before [facility] visit on 3/27/24. Please contact when orders   are ready.   ---------------------------------------------------------------------------  --------------  CALL BACK INFO    [phone]; OK to leave message on voicemail,OK to respond with electronic   message via [employer] portal (only for patients who have registered [employer]   account)  ---------------------------------------------------------------------------  --------------

## 2024-01-04 NOTE — TELEPHONE ENCOUNTER
Orders are only good for 30 days now.    Please call  to appointment date.    Patient has been notified.

## 2024-02-13 RX ORDER — LEVOTHYROXINE SODIUM 0.05 MG/1
TABLET ORAL
Qty: 90 TABLET | Refills: 0 | Status: SHIPPED | OUTPATIENT
Start: 2024-02-13

## 2024-02-13 RX ORDER — PRAVASTATIN SODIUM 40 MG
TABLET ORAL
Qty: 90 TABLET | Refills: 0 | Status: SHIPPED | OUTPATIENT
Start: 2024-02-13

## 2024-02-13 NOTE — TELEPHONE ENCOUNTER
Pt is calling in to let us know he is now using [facility] :     [facility]  Member ID: [id number]  Plan # [id number]  BIN# [id number]  Ph: [phone]    Please reffill the medications listed below:     levothyroxine (SYNTHROID) 50 MCG tablet   pravastatin (PRAVACHOL) 40 MG tablet     Please advise at: [phone]   [email or web address]

## 2024-03-08 ENCOUNTER — TELEPHONE (OUTPATIENT)
Dept: INTERNAL MEDICINE CLINIC | Age: 74
End: 2024-03-08

## 2024-03-08 DIAGNOSIS — E06.3 HYPOTHYROIDISM DUE TO HASHIMOTO'S THYROIDITIS: ICD-10-CM

## 2024-03-08 DIAGNOSIS — E03.8 HYPOTHYROIDISM DUE TO HASHIMOTO'S THYROIDITIS: ICD-10-CM

## 2024-03-08 DIAGNOSIS — Z12.5 SCREENING PSA (PROSTATE SPECIFIC ANTIGEN): ICD-10-CM

## 2024-03-08 DIAGNOSIS — E78.00 PURE HYPERCHOLESTEROLEMIA: Primary | ICD-10-CM

## 2024-03-08 DIAGNOSIS — I10 ESSENTIAL HYPERTENSION, BENIGN: ICD-10-CM

## 2024-03-13 DIAGNOSIS — I10 ESSENTIAL HYPERTENSION, BENIGN: ICD-10-CM

## 2024-03-13 DIAGNOSIS — E78.00 PURE HYPERCHOLESTEROLEMIA: ICD-10-CM

## 2024-03-13 DIAGNOSIS — E06.3 HYPOTHYROIDISM DUE TO HASHIMOTO'S THYROIDITIS: ICD-10-CM

## 2024-03-13 DIAGNOSIS — E03.8 HYPOTHYROIDISM DUE TO HASHIMOTO'S THYROIDITIS: ICD-10-CM

## 2024-03-13 DIAGNOSIS — Z12.5 SCREENING PSA (PROSTATE SPECIFIC ANTIGEN): ICD-10-CM

## 2024-03-13 LAB
ALBUMIN SERPL-MCNC: 4.7 G/DL (ref 3.4–5)
ALBUMIN/GLOB SERPL: 1.9 {RATIO} (ref 1.1–2.2)
ALP SERPL-CCNC: 80 U/L (ref 40–129)
ALT SERPL-CCNC: 26 U/L (ref 10–40)
ANION GAP SERPL CALCULATED.3IONS-SCNC: 11 MMOL/L (ref 3–16)
AST SERPL-CCNC: 26 U/L (ref 15–37)
BASOPHILS # BLD: 0.1 K/UL (ref 0–0.2)
BASOPHILS NFR BLD: 1.4 %
BILIRUB SERPL-MCNC: 1.2 MG/DL (ref 0–1)
BILIRUB UR QL STRIP.AUTO: NEGATIVE
BUN SERPL-MCNC: 23 MG/DL (ref 7–20)
CALCIUM SERPL-MCNC: 9.7 MG/DL (ref 8.3–10.6)
CHLORIDE SERPL-SCNC: 101 MMOL/L (ref 99–110)
CHOLEST SERPL-MCNC: 157 MG/DL (ref 0–199)
CLARITY UR: CLEAR
CO2 SERPL-SCNC: 29 MMOL/L (ref 21–32)
COLOR UR: YELLOW
CREAT SERPL-MCNC: 1.3 MG/DL (ref 0.8–1.3)
DEPRECATED RDW RBC AUTO: 13.6 % (ref 12.4–15.4)
EOSINOPHIL # BLD: 0.3 K/UL (ref 0–0.6)
EOSINOPHIL NFR BLD: 4.9 %
GFR SERPLBLD CREATININE-BSD FMLA CKD-EPI: 58 ML/MIN/{1.73_M2}
GLUCOSE SERPL-MCNC: 97 MG/DL (ref 70–99)
GLUCOSE UR STRIP.AUTO-MCNC: NEGATIVE MG/DL
HCT VFR BLD AUTO: 44 % (ref 40.5–52.5)
HDLC SERPL-MCNC: 52 MG/DL (ref 40–60)
HGB BLD-MCNC: 15.4 G/DL (ref 13.5–17.5)
HGB UR QL STRIP.AUTO: NEGATIVE
KETONES UR STRIP.AUTO-MCNC: NEGATIVE MG/DL
LDLC SERPL CALC-MCNC: 78 MG/DL
LEUKOCYTE ESTERASE UR QL STRIP.AUTO: NEGATIVE
LYMPHOCYTES # BLD: 2.6 K/UL (ref 1–5.1)
LYMPHOCYTES NFR BLD: 49.5 %
MCH RBC QN AUTO: 31.7 PG (ref 26–34)
MCHC RBC AUTO-ENTMCNC: 35 G/DL (ref 31–36)
MCV RBC AUTO: 90.4 FL (ref 80–100)
MONOCYTES # BLD: 0.4 K/UL (ref 0–1.3)
MONOCYTES NFR BLD: 7.9 %
NEUTROPHILS # BLD: 1.9 K/UL (ref 1.7–7.7)
NEUTROPHILS NFR BLD: 36.3 %
NITRITE UR QL STRIP.AUTO: NEGATIVE
PH UR STRIP.AUTO: 6.5 [PH] (ref 5–8)
PLATELET # BLD AUTO: 235 K/UL (ref 135–450)
PMV BLD AUTO: 8.7 FL (ref 5–10.5)
POTASSIUM SERPL-SCNC: 4.2 MMOL/L (ref 3.5–5.1)
PROT SERPL-MCNC: 7.2 G/DL (ref 6.4–8.2)
PROT UR STRIP.AUTO-MCNC: NEGATIVE MG/DL
PSA SERPL DL<=0.01 NG/ML-MCNC: 2.61 NG/ML (ref 0–4)
RBC # BLD AUTO: 4.87 M/UL (ref 4.2–5.9)
SODIUM SERPL-SCNC: 141 MMOL/L (ref 136–145)
SP GR UR STRIP.AUTO: 1.02 (ref 1–1.03)
TRIGL SERPL-MCNC: 136 MG/DL (ref 0–150)
TSH SERPL DL<=0.005 MIU/L-ACNC: 2.85 UIU/ML (ref 0.27–4.2)
UA DIPSTICK W REFLEX MICRO PNL UR: NORMAL
URN SPEC COLLECT METH UR: NORMAL
UROBILINOGEN UR STRIP-ACNC: 0.2 E.U./DL
VLDLC SERPL CALC-MCNC: 27 MG/DL
WBC # BLD AUTO: 5.2 K/UL (ref 4–11)

## 2024-03-24 SDOH — HEALTH STABILITY: PHYSICAL HEALTH: ON AVERAGE, HOW MANY MINUTES DO YOU ENGAGE IN EXERCISE AT THIS LEVEL?: 50 MIN

## 2024-03-24 SDOH — HEALTH STABILITY: PHYSICAL HEALTH: ON AVERAGE, HOW MANY DAYS PER WEEK DO YOU ENGAGE IN MODERATE TO STRENUOUS EXERCISE (LIKE A BRISK WALK)?: 5 DAYS

## 2024-03-24 ASSESSMENT — PATIENT HEALTH QUESTIONNAIRE - PHQ9
SUM OF ALL RESPONSES TO PHQ QUESTIONS 1-9: 0
SUM OF ALL RESPONSES TO PHQ QUESTIONS 1-9: 0
1. LITTLE INTEREST OR PLEASURE IN DOING THINGS: NOT AT ALL
SUM OF ALL RESPONSES TO PHQ9 QUESTIONS 1 & 2: 0
2. FEELING DOWN, DEPRESSED OR HOPELESS: NOT AT ALL
SUM OF ALL RESPONSES TO PHQ QUESTIONS 1-9: 0
SUM OF ALL RESPONSES TO PHQ QUESTIONS 1-9: 0

## 2024-03-24 ASSESSMENT — LIFESTYLE VARIABLES
HOW OFTEN DO YOU HAVE A DRINK CONTAINING ALCOHOL: 3
HOW OFTEN DO YOU HAVE SIX OR MORE DRINKS ON ONE OCCASION: 1
HOW MANY STANDARD DRINKS CONTAINING ALCOHOL DO YOU HAVE ON A TYPICAL DAY: 1
HOW OFTEN DO YOU HAVE A DRINK CONTAINING ALCOHOL: 2-4 TIMES A MONTH
HOW MANY STANDARD DRINKS CONTAINING ALCOHOL DO YOU HAVE ON A TYPICAL DAY: 1 OR 2

## 2024-03-27 ENCOUNTER — OFFICE VISIT (OUTPATIENT)
Dept: INTERNAL MEDICINE CLINIC | Age: 74
End: 2024-03-27

## 2024-03-27 VITALS
DIASTOLIC BLOOD PRESSURE: 80 MMHG | BODY MASS INDEX: 26.96 KG/M2 | SYSTOLIC BLOOD PRESSURE: 140 MMHG | WEIGHT: 182 LBS | HEIGHT: 69 IN

## 2024-03-27 DIAGNOSIS — I10 ESSENTIAL HYPERTENSION, BENIGN: ICD-10-CM

## 2024-03-27 DIAGNOSIS — E78.00 PURE HYPERCHOLESTEROLEMIA: Primary | ICD-10-CM

## 2024-03-27 DIAGNOSIS — E06.3 HYPOTHYROIDISM DUE TO HASHIMOTO'S THYROIDITIS: ICD-10-CM

## 2024-03-27 DIAGNOSIS — E03.8 HYPOTHYROIDISM DUE TO HASHIMOTO'S THYROIDITIS: ICD-10-CM

## 2024-03-27 DIAGNOSIS — D12.5 ADENOMATOUS POLYP OF SIGMOID COLON: ICD-10-CM

## 2024-03-27 PROBLEM — D12.6 ADENOMATOUS POLYP OF COLON: Status: RESOLVED | Noted: 2023-03-20 | Resolved: 2024-03-27

## 2024-03-27 SDOH — ECONOMIC STABILITY: FOOD INSECURITY: WITHIN THE PAST 12 MONTHS, YOU WORRIED THAT YOUR FOOD WOULD RUN OUT BEFORE YOU GOT MONEY TO BUY MORE.: NEVER TRUE

## 2024-03-27 SDOH — ECONOMIC STABILITY: FOOD INSECURITY: WITHIN THE PAST 12 MONTHS, THE FOOD YOU BOUGHT JUST DIDN'T LAST AND YOU DIDN'T HAVE MONEY TO GET MORE.: NEVER TRUE

## 2024-03-27 SDOH — ECONOMIC STABILITY: INCOME INSECURITY: HOW HARD IS IT FOR YOU TO PAY FOR THE VERY BASICS LIKE FOOD, HOUSING, MEDICAL CARE, AND HEATING?: NOT HARD AT ALL

## 2024-03-27 NOTE — PROGRESS NOTES
Medicare Annual Wellness Visit    Mandeep Siddiqi is here for Medicare AWV         No follow-ups on file.     Subjective       Patient's complete Health Risk Assessment and screening values have been reviewed and are found in Flowsheets. The following problems were reviewed today and where indicated follow up appointments were made and/or referrals ordered.    No Positive Risk Factors identified today.                                  Objective   Vitals:    03/27/24 1421   BP: (!) 166/102   Weight: 82.6 kg (182 lb)   Height: 1.753 m (5' 9\")      Body mass index is 26.88 kg/m².      General Appearance: alert and oriented to person, place and time, well developed and well- nourished, in no acute distress  Skin: warm and dry, no rash or erythema  Head: normocephalic and atraumatic  Eyes: pupils equal, round, and reactive to light, extraocular eye movements intact, conjunctivae normal  ENT: tympanic membrane, external ear and ear canal normal bilaterally, nose without deformity, nasal mucosa and turbinates normal without polyps  Neck: supple and non-tender without mass, no thyromegaly or thyroid nodules, no cervical lymphadenopathy  Pulmonary/Chest: clear to auscultation bilaterally- no wheezes, rales or rhonchi, normal air movement, no respiratory distress  Cardiovascular: normal rate, regular rhythm, normal S1 and S2, no murmurs, rubs, clicks, or gallops, distal pulses intact, no carotid bruits  Abdomen: soft, non-tender, non-distended, normal bowel sounds, no masses or organomegaly  Extremities: no cyanosis, clubbing or edema  Musculoskeletal: normal range of motion, no joint swelling, deformity or tenderness  Neurologic: reflexes normal and symmetric, no cranial nerve deficit, gait, coordination and speech normal       No Known Allergies  Prior to Visit Medications    Medication Sig Taking? Authorizing Provider   levothyroxine (SYNTHROID) 50 MCG tablet TAKE 1 TABLET DAILY Yes Tio Khan MD   pravastatin 
8/18/2010    Depressive disorder, not elsewhere classified 8/18/2010    Essential hypertension, benign 8/18/2010    Hypothyroid 8/18/2014    Myalgia 9/21/2012    Other and unspecified hyperlipidemia 8/18/2010    Subclinical hypothyroidism 8/2/2013       Past Surgical History:        Procedure Laterality Date    UVULOPALATOPHARYGOPLASTY         Family History:  Family History   Problem Relation Age of Onset    Diabetes Father     Hypertension Father        Social History:  Social History     Socioeconomic History    Marital status:      Spouse name: None    Number of children: None    Years of education: None    Highest education level: None   Tobacco Use    Smoking status: Never    Smokeless tobacco: Never   Substance and Sexual Activity    Alcohol use: Yes     Comment: occasionally     Social Determinants of Health     Financial Resource Strain: Low Risk  (3/27/2024)    Overall Financial Resource Strain (CARDIA)     Difficulty of Paying Living Expenses: Not hard at all   Food Insecurity: No Food Insecurity (3/27/2024)    Hunger Vital Sign     Worried About Running Out of Food in the Last Year: Never true     Ran Out of Food in the Last Year: Never true   Transportation Needs: Unknown (3/27/2024)    PRAPARE - Transportation     Lack of Transportation (Non-Medical): No   Physical Activity: Sufficiently Active (3/24/2024)    Exercise Vital Sign     Days of Exercise per Week: 5 days     Minutes of Exercise per Session: 50 min   Housing Stability: Unknown (3/27/2024)    Housing Stability Vital Sign     Unstable Housing in the Last Year: No         Allergies:  Patient has no known allergies.    Current Medications:    Prior to Admission medications    Medication Sig Start Date End Date Taking? Authorizing Provider   levothyroxine (SYNTHROID) 50 MCG tablet TAKE 1 TABLET DAILY 2/13/24  Yes Tio Khan MD   pravastatin (PRAVACHOL) 40 MG tablet TAKE 1 TABLET EVERY EVENING 2/13/24  Yes Tio Khan MD   PARoxetine

## 2024-04-01 RX ORDER — PRAVASTATIN SODIUM 40 MG
TABLET ORAL
Qty: 90 TABLET | Refills: 2 | Status: SHIPPED | OUTPATIENT
Start: 2024-04-01

## 2024-04-01 RX ORDER — LOSARTAN POTASSIUM AND HYDROCHLOROTHIAZIDE 25; 100 MG/1; MG/1
1 TABLET ORAL DAILY
Qty: 90 TABLET | Refills: 2 | Status: SHIPPED | OUTPATIENT
Start: 2024-04-01

## 2024-04-01 RX ORDER — LEVOTHYROXINE SODIUM 0.05 MG/1
TABLET ORAL
Qty: 90 TABLET | Refills: 2 | Status: SHIPPED | OUTPATIENT
Start: 2024-04-01

## 2024-04-01 NOTE — TELEPHONE ENCOUNTER
REFILL    losartan-hydroCHLOROthiazide (HYZAAR) 100-25 MG per tablet     pravastatin (PRAVACHOL) 40 MG tablet     levothyroxine (SYNTHROID) 50 MCG tablet     90 day supplies on all with refills    [facility] - [address] - P [phone] - F [phone]

## 2024-07-24 DIAGNOSIS — F41.9 ANXIETY: ICD-10-CM

## 2024-07-24 DIAGNOSIS — R04.0 EPISTAXIS: ICD-10-CM

## 2024-07-24 RX ORDER — PAROXETINE HYDROCHLORIDE 40 MG/1
40 TABLET, FILM COATED ORAL EVERY MORNING
Qty: 90 TABLET | Refills: 1 | Status: SHIPPED | OUTPATIENT
Start: 2024-07-24

## 2024-07-24 NOTE — TELEPHONE ENCOUNTER
Pt is requesting the following refill    PARoxetine (PAXIL) 40 MG tablet   Pt is only needing one 90 day supply      [facility] - [place], [place] [address] - P [phone] - F [phone]

## 2024-12-04 RX ORDER — LOSARTAN POTASSIUM AND HYDROCHLOROTHIAZIDE 25; 100 MG/1; MG/1
1 TABLET ORAL DAILY
Qty: 90 TABLET | Refills: 3 | Status: SHIPPED | OUTPATIENT
Start: 2024-12-04

## 2025-01-15 ENCOUNTER — TELEPHONE (OUTPATIENT)
Dept: INTERNAL MEDICINE CLINIC | Age: 75
End: 2025-01-15

## 2025-01-15 NOTE — TELEPHONE ENCOUNTER
----- Message from Shaun MCCULLOUGH sent at 1/14/2025 11:12 AM EST -----  Regarding: [facility] Appointment Request  [facility] Appointment Request    Patient needs appointment for [facility] Appointment Type: Pre-Op Visit.    Patient Requested Dates(s): JANUARY 30   Patient Requested Time: MORNING 9:00AM   Provider Name: PCP    Reason for Appointment Request: Established Patient - No appointments available during search  --------------------------------------------------------------------------------------------------------------------------    Relationship to Patient: Self     Call Back Information: OK to leave message on voicemail  Preferred Call Back Number: [phone]  
LVM to call back and schedule pre op       
4 weeks

## 2025-01-20 RX ORDER — LEVOTHYROXINE SODIUM 50 UG/1
TABLET ORAL
Qty: 90 TABLET | Refills: 3 | Status: SHIPPED | OUTPATIENT
Start: 2025-01-20

## 2025-01-20 RX ORDER — PRAVASTATIN SODIUM 40 MG
TABLET ORAL
Qty: 90 TABLET | Refills: 3 | Status: SHIPPED | OUTPATIENT
Start: 2025-01-20

## 2025-02-20 DIAGNOSIS — F41.9 ANXIETY: ICD-10-CM

## 2025-02-20 DIAGNOSIS — R04.0 EPISTAXIS: ICD-10-CM

## 2025-02-20 RX ORDER — PAROXETINE 40 MG/1
40 TABLET, FILM COATED ORAL EVERY MORNING
Qty: 14 TABLET | Refills: 0 | Status: SHIPPED | OUTPATIENT
Start: 2025-02-20

## 2025-02-20 NOTE — TELEPHONE ENCOUNTER
Pt is completely and [facility] can't delivery until next week and needs a two week supply    [phone]  Pls call and advise            PARoxetine (PAXIL) 40 MG tablet [[id number]]     [facility] [id number] [facility] [address] - P [phone] - F [phone]

## 2025-03-02 DIAGNOSIS — F41.9 ANXIETY: ICD-10-CM

## 2025-03-02 DIAGNOSIS — R04.0 EPISTAXIS: ICD-10-CM

## 2025-03-03 RX ORDER — PAROXETINE 40 MG/1
40 TABLET, FILM COATED ORAL EVERY MORNING
Qty: 14 TABLET | Refills: 0 | Status: SHIPPED | OUTPATIENT
Start: 2025-03-03

## 2025-06-25 ENCOUNTER — TELEPHONE (OUTPATIENT)
Dept: INTERNAL MEDICINE CLINIC | Age: 75
End: 2025-06-25

## 2025-06-25 DIAGNOSIS — Z12.5 SCREENING PSA (PROSTATE SPECIFIC ANTIGEN): ICD-10-CM

## 2025-06-25 DIAGNOSIS — E06.3 HYPOTHYROIDISM DUE TO HASHIMOTO THYROIDITIS: ICD-10-CM

## 2025-06-25 DIAGNOSIS — I10 ESSENTIAL HYPERTENSION, BENIGN: ICD-10-CM

## 2025-06-25 DIAGNOSIS — E78.00 PURE HYPERCHOLESTEROLEMIA: ICD-10-CM

## 2025-06-25 DIAGNOSIS — F41.1 GAD (GENERALIZED ANXIETY DISORDER): ICD-10-CM

## 2025-06-25 DIAGNOSIS — E06.3 HYPOTHYROIDISM DUE TO HASHIMOTO'S THYROIDITIS: ICD-10-CM

## 2025-06-25 DIAGNOSIS — E55.9 VITAMIN D DEFICIENCY: ICD-10-CM

## 2025-06-25 DIAGNOSIS — F41.9 ANXIETY: Primary | ICD-10-CM

## 2025-06-25 NOTE — TELEPHONE ENCOUNTER
Pt called asking for blood work orders to be placed before 7-15-25 and is asking to make sure Vit D and Testosterone Level are on order    [phone]  Pls call and advise

## 2025-07-09 DIAGNOSIS — Z12.5 SCREENING PSA (PROSTATE SPECIFIC ANTIGEN): ICD-10-CM

## 2025-07-09 DIAGNOSIS — E06.3 HYPOTHYROIDISM DUE TO HASHIMOTO THYROIDITIS: ICD-10-CM

## 2025-07-09 DIAGNOSIS — F41.1 GAD (GENERALIZED ANXIETY DISORDER): ICD-10-CM

## 2025-07-09 DIAGNOSIS — E06.3 HYPOTHYROIDISM DUE TO HASHIMOTO'S THYROIDITIS: ICD-10-CM

## 2025-07-09 DIAGNOSIS — E55.9 VITAMIN D DEFICIENCY: ICD-10-CM

## 2025-07-09 DIAGNOSIS — I10 ESSENTIAL HYPERTENSION, BENIGN: ICD-10-CM

## 2025-07-09 DIAGNOSIS — F41.9 ANXIETY: ICD-10-CM

## 2025-07-09 DIAGNOSIS — E78.00 PURE HYPERCHOLESTEROLEMIA: ICD-10-CM

## 2025-07-09 LAB
25(OH)D3 SERPL-MCNC: 46.7 NG/ML
ALBUMIN SERPL-MCNC: 4.6 G/DL (ref 3.4–5)
ALBUMIN/GLOB SERPL: 2 {RATIO} (ref 1.1–2.2)
ALP SERPL-CCNC: 63 U/L (ref 40–129)
ALT SERPL-CCNC: 29 U/L (ref 10–40)
ANION GAP SERPL CALCULATED.3IONS-SCNC: 13 MMOL/L (ref 3–16)
AST SERPL-CCNC: 30 U/L (ref 15–37)
BASOPHILS # BLD: 0 K/UL (ref 0–0.2)
BASOPHILS NFR BLD: 0.9 %
BILIRUB SERPL-MCNC: 1.3 MG/DL (ref 0–1)
BILIRUB UR QL STRIP.AUTO: NEGATIVE
BUN SERPL-MCNC: 30 MG/DL (ref 7–20)
CALCIUM SERPL-MCNC: 9.7 MG/DL (ref 8.3–10.6)
CHLORIDE SERPL-SCNC: 101 MMOL/L (ref 99–110)
CHOLEST SERPL-MCNC: 163 MG/DL (ref 0–199)
CLARITY UR: CLEAR
CO2 SERPL-SCNC: 28 MMOL/L (ref 21–32)
COLOR UR: YELLOW
CREAT SERPL-MCNC: 1.1 MG/DL (ref 0.8–1.3)
DEPRECATED RDW RBC AUTO: 13.3 % (ref 12.4–15.4)
EOSINOPHIL # BLD: 0.2 K/UL (ref 0–0.6)
EOSINOPHIL NFR BLD: 3.7 %
GFR SERPLBLD CREATININE-BSD FMLA CKD-EPI: 70 ML/MIN/{1.73_M2}
GLUCOSE SERPL-MCNC: 88 MG/DL (ref 70–99)
GLUCOSE UR STRIP.AUTO-MCNC: NEGATIVE MG/DL
HCT VFR BLD AUTO: 42.7 % (ref 40.5–52.5)
HDLC SERPL-MCNC: 66 MG/DL (ref 40–60)
HGB BLD-MCNC: 14.4 G/DL (ref 13.5–17.5)
HGB UR QL STRIP.AUTO: NEGATIVE
KETONES UR STRIP.AUTO-MCNC: NEGATIVE MG/DL
LDLC SERPL CALC-MCNC: 75 MG/DL
LEUKOCYTE ESTERASE UR QL STRIP.AUTO: NEGATIVE
LYMPHOCYTES # BLD: 2.1 K/UL (ref 1–5.1)
LYMPHOCYTES NFR BLD: 43.8 %
MCH RBC QN AUTO: 31.2 PG (ref 26–34)
MCHC RBC AUTO-ENTMCNC: 33.9 G/DL (ref 31–36)
MCV RBC AUTO: 92.1 FL (ref 80–100)
MONOCYTES # BLD: 0.4 K/UL (ref 0–1.3)
MONOCYTES NFR BLD: 8.5 %
NEUTROPHILS # BLD: 2.1 K/UL (ref 1.7–7.7)
NEUTROPHILS NFR BLD: 43.1 %
NITRITE UR QL STRIP.AUTO: NEGATIVE
PH UR STRIP.AUTO: 6.5 [PH] (ref 5–8)
PLATELET # BLD AUTO: 200 K/UL (ref 135–450)
PMV BLD AUTO: 9.7 FL (ref 5–10.5)
POTASSIUM SERPL-SCNC: 4 MMOL/L (ref 3.5–5.1)
PROT SERPL-MCNC: 6.9 G/DL (ref 6.4–8.2)
PROT UR STRIP.AUTO-MCNC: NEGATIVE MG/DL
PSA SERPL DL<=0.01 NG/ML-MCNC: 2.77 NG/ML (ref 0–4)
RBC # BLD AUTO: 4.63 M/UL (ref 4.2–5.9)
SODIUM SERPL-SCNC: 142 MMOL/L (ref 136–145)
SP GR UR STRIP.AUTO: 1.02 (ref 1–1.03)
TRIGL SERPL-MCNC: 111 MG/DL (ref 0–150)
TSH SERPL DL<=0.005 MIU/L-ACNC: 2.47 UIU/ML (ref 0.27–4.2)
UA DIPSTICK W REFLEX MICRO PNL UR: NORMAL
URN SPEC COLLECT METH UR: NORMAL
UROBILINOGEN UR STRIP-ACNC: 1 E.U./DL
VLDLC SERPL CALC-MCNC: 22 MG/DL
WBC # BLD AUTO: 4.8 K/UL (ref 4–11)

## 2025-07-12 LAB — TESTOST SERPL-MCNC: 341 NG/DL (ref 193–740)

## 2025-07-14 SDOH — HEALTH STABILITY: PHYSICAL HEALTH: ON AVERAGE, HOW MANY DAYS PER WEEK DO YOU ENGAGE IN MODERATE TO STRENUOUS EXERCISE (LIKE A BRISK WALK)?: 6 DAYS

## 2025-07-14 SDOH — ECONOMIC STABILITY: FOOD INSECURITY: WITHIN THE PAST 12 MONTHS, THE FOOD YOU BOUGHT JUST DIDN'T LAST AND YOU DIDN'T HAVE MONEY TO GET MORE.: NEVER TRUE

## 2025-07-14 SDOH — ECONOMIC STABILITY: FOOD INSECURITY: WITHIN THE PAST 12 MONTHS, YOU WORRIED THAT YOUR FOOD WOULD RUN OUT BEFORE YOU GOT MONEY TO BUY MORE.: NEVER TRUE

## 2025-07-14 SDOH — ECONOMIC STABILITY: INCOME INSECURITY: IN THE LAST 12 MONTHS, WAS THERE A TIME WHEN YOU WERE NOT ABLE TO PAY THE MORTGAGE OR RENT ON TIME?: NO

## 2025-07-14 SDOH — HEALTH STABILITY: PHYSICAL HEALTH: ON AVERAGE, HOW MANY MINUTES DO YOU ENGAGE IN EXERCISE AT THIS LEVEL?: 60 MIN

## 2025-07-14 SDOH — ECONOMIC STABILITY: TRANSPORTATION INSECURITY
IN THE PAST 12 MONTHS, HAS THE LACK OF TRANSPORTATION KEPT YOU FROM MEDICAL APPOINTMENTS OR FROM GETTING MEDICATIONS?: NO

## 2025-07-14 ASSESSMENT — LIFESTYLE VARIABLES
HOW MANY STANDARD DRINKS CONTAINING ALCOHOL DO YOU HAVE ON A TYPICAL DAY: 1
HOW OFTEN DO YOU HAVE A DRINK CONTAINING ALCOHOL: 3
HOW OFTEN DO YOU HAVE SIX OR MORE DRINKS ON ONE OCCASION: 1
HOW OFTEN DO YOU HAVE A DRINK CONTAINING ALCOHOL: 2-4 TIMES A MONTH
HOW MANY STANDARD DRINKS CONTAINING ALCOHOL DO YOU HAVE ON A TYPICAL DAY: 1 OR 2

## 2025-07-14 ASSESSMENT — PATIENT HEALTH QUESTIONNAIRE - PHQ9
SUM OF ALL RESPONSES TO PHQ QUESTIONS 1-9: 0
1. LITTLE INTEREST OR PLEASURE IN DOING THINGS: NOT AT ALL
SUM OF ALL RESPONSES TO PHQ QUESTIONS 1-9: 0
SUM OF ALL RESPONSES TO PHQ QUESTIONS 1-9: 0
2. FEELING DOWN, DEPRESSED OR HOPELESS: NOT AT ALL
SUM OF ALL RESPONSES TO PHQ QUESTIONS 1-9: 0

## 2025-07-15 ENCOUNTER — OFFICE VISIT (OUTPATIENT)
Dept: INTERNAL MEDICINE CLINIC | Age: 75
End: 2025-07-15

## 2025-07-15 VITALS
SYSTOLIC BLOOD PRESSURE: 140 MMHG | HEIGHT: 69 IN | WEIGHT: 167 LBS | DIASTOLIC BLOOD PRESSURE: 80 MMHG | BODY MASS INDEX: 24.73 KG/M2

## 2025-07-15 DIAGNOSIS — J30.1 ALLERGIC RHINITIS DUE TO POLLEN, UNSPECIFIED SEASONALITY: ICD-10-CM

## 2025-07-15 DIAGNOSIS — E06.3 HYPOTHYROIDISM DUE TO HASHIMOTO THYROIDITIS: ICD-10-CM

## 2025-07-15 DIAGNOSIS — Z23 NEED FOR PNEUMOCOCCAL 20-VALENT CONJUGATE VACCINATION: ICD-10-CM

## 2025-07-15 DIAGNOSIS — E78.00 PURE HYPERCHOLESTEROLEMIA: ICD-10-CM

## 2025-07-15 DIAGNOSIS — F41.1 GAD (GENERALIZED ANXIETY DISORDER): ICD-10-CM

## 2025-07-15 DIAGNOSIS — I10 ESSENTIAL HYPERTENSION, BENIGN: ICD-10-CM

## 2025-07-15 DIAGNOSIS — Z00.00 PE (PHYSICAL EXAM), ANNUAL: Primary | ICD-10-CM

## 2025-07-15 DIAGNOSIS — D12.5 ADENOMATOUS POLYP OF SIGMOID COLON: ICD-10-CM

## 2025-07-15 ASSESSMENT — PATIENT HEALTH QUESTIONNAIRE - PHQ9
2. FEELING DOWN, DEPRESSED OR HOPELESS: NOT AT ALL
7. TROUBLE CONCENTRATING ON THINGS, SUCH AS READING THE NEWSPAPER OR WATCHING TELEVISION: NOT AT ALL
SUM OF ALL RESPONSES TO PHQ QUESTIONS 1-9: 0
SUM OF ALL RESPONSES TO PHQ QUESTIONS 1-9: 0
5. POOR APPETITE OR OVEREATING: NOT AT ALL
4. FEELING TIRED OR HAVING LITTLE ENERGY: NOT AT ALL
8. MOVING OR SPEAKING SO SLOWLY THAT OTHER PEOPLE COULD HAVE NOTICED. OR THE OPPOSITE, BEING SO FIGETY OR RESTLESS THAT YOU HAVE BEEN MOVING AROUND A LOT MORE THAN USUAL: NOT AT ALL
SUM OF ALL RESPONSES TO PHQ QUESTIONS 1-9: 0
9. THOUGHTS THAT YOU WOULD BE BETTER OFF DEAD, OR OF HURTING YOURSELF: NOT AT ALL
10. IF YOU CHECKED OFF ANY PROBLEMS, HOW DIFFICULT HAVE THESE PROBLEMS MADE IT FOR YOU TO DO YOUR WORK, TAKE CARE OF THINGS AT HOME, OR GET ALONG WITH OTHER PEOPLE: NOT DIFFICULT AT ALL
3. TROUBLE FALLING OR STAYING ASLEEP: NOT AT ALL
1. LITTLE INTEREST OR PLEASURE IN DOING THINGS: NOT AT ALL
SUM OF ALL RESPONSES TO PHQ QUESTIONS 1-9: 0
6. FEELING BAD ABOUT YOURSELF - OR THAT YOU ARE A FAILURE OR HAVE LET YOURSELF OR YOUR FAMILY DOWN: NOT AT ALL

## 2025-07-15 NOTE — PROGRESS NOTES
Medicare Annual Wellness Visit    Mandeep Siddiqi is here for Medicare AWV    Assessment & Plan   Essential hypertension, benign  Hypothyroidism due to Hashimoto thyroiditis  Pure hypercholesterolemia  Allergic rhinitis due to pollen, unspecified seasonality  Adenomatous polyp of sigmoid colon  JABIER (generalized anxiety disorder)       No follow-ups on file.     Subjective   The following acute and/or chronic problems were also addressed today:      Patient's complete Health Risk Assessment and screening values have been reviewed and are found in Flowsheets. The following problems were reviewed today and where indicated follow up appointments were made and/or referrals ordered.    No Positive Risk Factors identified today.                                     Objective   Vitals:    07/15/25 1023 07/15/25 1030   BP: (!) 180/90 (!) 178/80   Weight: 75.8 kg (167 lb)    Height: 1.753 m (5' 9\")       Body mass index is 24.66 kg/m².        General Appearance: alert and oriented to person, place and time, well developed and well- nourished, in no acute distress  Skin: warm and dry, no rash or erythema  Head: normocephalic and atraumatic  Eyes: pupils equal, round, and reactive to light, extraocular eye movements intact, conjunctivae normal  ENT: tympanic membrane, external ear and ear canal normal bilaterally, nose without deformity, nasal mucosa and turbinates normal without polyps  Neck: supple and non-tender without mass, no thyromegaly or thyroid nodules, no cervical lymphadenopathy  Pulmonary/Chest: clear to auscultation bilaterally- no wheezes, rales or rhonchi, normal air movement, no respiratory distress  Cardiovascular: normal rate, regular rhythm, normal S1 and S2, no murmurs, rubs, clicks, or gallops, distal pulses intact, no carotid bruits  Abdomen: soft, non-tender, non-distended, normal bowel sounds, no masses or organomegaly  Extremities: no cyanosis, clubbing or edema  Musculoskeletal: normal range of motion,

## 2025-07-15 NOTE — PROGRESS NOTES
Annual Wellness Visit     Patient:  Mandeep Siddiqi                                               : 1950  Age: 75 y.o.  MRN: [id number]  Date : 7/15/2025      CHIEF COMPLAINT: Mandeep Siddiqi is a 75 y.o. male who presents for : Physical exam    1. Essential hypertension, benign  Pressures been well-controlled at home averaging around 120-125/80    2. Hypothyroidism due to Hashimoto thyroiditis  No symptoms of hyper or hypothyroid    3. Pure hypercholesterolemia  No complaints no myalgia    4. Allergic rhinitis due to pollen, unspecified seasonality  This problem is stable    5. Adenomatous polyp of sigmoid colon  He is due for repeat colonoscopy this year  - Ian Power MD, Gastroenterology (ERCP & EUS), [facility]    6. JABIER (generalized anxiety disorder)  Problem is stable on present meds    7. PE (physical exam), annual  Generally feels good is active bicycling regularly denies any chest pain or shortness of breath  - Ian Power MD, Gastroenterology (ERCP & EUS), [facility]        Patient Active Problem List    Diagnosis Date Noted    Adenomatous polyp of sigmoid colon 2022    JABIER (generalized anxiety disorder) 2018    Hypothyroid 2014    Depressive disorder, not elsewhere classified 2010    Essential hypertension, benign 2010    Allergic rhinitis 2010    Pure hypercholesterolemia 2010       Constitutional:  Denies fever or chills   Eyes:  Denies change in visual acuity   HENT:  Denies nasal congestion or sore throat   Respiratory:  Denies cough or shortness of breath   Cardiovascular:  Denies chest pain or edema   GI:  Denies abdominal pain, nausea, vomiting, bloody stools or diarrhea   :  Denies dysuria   Musculoskeletal:  Denies back pain or joint pain   Integument:  Denies rash   Neurologic:  Denies headache, focal weakness or sensory changes   Endocrine:  Denies polyuria or polydipsia   Lymphatic:  Denies swollen glands

## 2025-09-02 DIAGNOSIS — R04.0 EPISTAXIS: ICD-10-CM

## 2025-09-02 DIAGNOSIS — F41.9 ANXIETY: ICD-10-CM

## 2025-09-02 RX ORDER — PAROXETINE 40 MG/1
40 TABLET, FILM COATED ORAL EVERY MORNING
Qty: 90 TABLET | Refills: 1 | Status: SHIPPED | OUTPATIENT
Start: 2025-09-02